# Patient Record
Sex: FEMALE | Race: WHITE | NOT HISPANIC OR LATINO | Employment: OTHER | URBAN - METROPOLITAN AREA
[De-identification: names, ages, dates, MRNs, and addresses within clinical notes are randomized per-mention and may not be internally consistent; named-entity substitution may affect disease eponyms.]

---

## 2023-10-07 ENCOUNTER — APPOINTMENT (OUTPATIENT)
Dept: RADIOLOGY | Facility: HOSPITAL | Age: 83
End: 2023-10-07
Payer: MEDICARE

## 2023-10-07 ENCOUNTER — HOSPITAL ENCOUNTER (OUTPATIENT)
Facility: HOSPITAL | Age: 83
Setting detail: OBSERVATION
Discharge: HOME | End: 2023-10-08
Attending: STUDENT IN AN ORGANIZED HEALTH CARE EDUCATION/TRAINING PROGRAM | Admitting: INTERNAL MEDICINE
Payer: MEDICARE

## 2023-10-07 DIAGNOSIS — R10.84 GENERALIZED ABDOMINAL PAIN: Primary | ICD-10-CM

## 2023-10-07 DIAGNOSIS — R10.10 PAIN OF UPPER ABDOMEN: ICD-10-CM

## 2023-10-07 PROBLEM — R10.9 ABDOMINAL PAIN: Status: ACTIVE | Noted: 2023-10-07

## 2023-10-07 LAB
ALBUMIN SERPL BCP-MCNC: 4.5 G/DL (ref 3.4–5)
ALP SERPL-CCNC: 66 U/L (ref 33–136)
ALT SERPL W P-5'-P-CCNC: 13 U/L (ref 7–45)
ANION GAP SERPL CALC-SCNC: 15 MMOL/L (ref 10–20)
APPEARANCE UR: CLEAR
AST SERPL W P-5'-P-CCNC: 17 U/L (ref 9–39)
BASOPHILS # BLD AUTO: 0.08 X10*3/UL (ref 0–0.1)
BASOPHILS NFR BLD AUTO: 1 %
BILIRUB SERPL-MCNC: 0.6 MG/DL (ref 0–1.2)
BILIRUB UR STRIP.AUTO-MCNC: NEGATIVE MG/DL
BUN SERPL-MCNC: 15 MG/DL (ref 6–23)
CALCIUM SERPL-MCNC: 9.2 MG/DL (ref 8.6–10.3)
CARDIAC TROPONIN I PNL SERPL HS: 6 NG/L (ref 0–13)
CHLORIDE SERPL-SCNC: 102 MMOL/L (ref 98–107)
CO2 SERPL-SCNC: 29 MMOL/L (ref 21–32)
COLOR UR: NORMAL
CREAT SERPL-MCNC: 0.62 MG/DL (ref 0.5–1.05)
EOSINOPHIL # BLD AUTO: 0.2 X10*3/UL (ref 0–0.4)
EOSINOPHIL NFR BLD AUTO: 2.5 %
ERYTHROCYTE [DISTWIDTH] IN BLOOD BY AUTOMATED COUNT: 13.2 % (ref 11.5–14.5)
GFR SERPL CREATININE-BSD FRML MDRD: 88 ML/MIN/1.73M*2
GLUCOSE SERPL-MCNC: 96 MG/DL (ref 74–99)
GLUCOSE UR STRIP.AUTO-MCNC: NEGATIVE MG/DL
HCT VFR BLD AUTO: 44.4 % (ref 36–46)
HGB BLD-MCNC: 14.1 G/DL (ref 12–16)
IMM GRANULOCYTES # BLD AUTO: 0.02 X10*3/UL (ref 0–0.5)
IMM GRANULOCYTES NFR BLD AUTO: 0.3 % (ref 0–0.9)
KETONES UR STRIP.AUTO-MCNC: NEGATIVE MG/DL
LACTATE SERPL-SCNC: 1.1 MMOL/L (ref 0.4–2)
LEUKOCYTE ESTERASE UR QL STRIP.AUTO: NEGATIVE
LIPASE SERPL-CCNC: 12 U/L (ref 9–82)
LYMPHOCYTES # BLD AUTO: 1.85 X10*3/UL (ref 0.8–3)
LYMPHOCYTES NFR BLD AUTO: 23.5 %
MCH RBC QN AUTO: 29.6 PG (ref 26–34)
MCHC RBC AUTO-ENTMCNC: 31.8 G/DL (ref 32–36)
MCV RBC AUTO: 93 FL (ref 80–100)
MONOCYTES # BLD AUTO: 0.78 X10*3/UL (ref 0.05–0.8)
MONOCYTES NFR BLD AUTO: 9.9 %
NEUTROPHILS # BLD AUTO: 4.93 X10*3/UL (ref 1.6–5.5)
NEUTROPHILS NFR BLD AUTO: 62.8 %
NITRITE UR QL STRIP.AUTO: NEGATIVE
NRBC BLD-RTO: 0 /100 WBCS (ref 0–0)
PH UR STRIP.AUTO: 7 [PH]
PLATELET # BLD AUTO: 244 X10*3/UL (ref 150–450)
PMV BLD AUTO: 10 FL (ref 7.5–11.5)
POTASSIUM SERPL-SCNC: 3.5 MMOL/L (ref 3.5–5.3)
PROT SERPL-MCNC: 6.9 G/DL (ref 6.4–8.2)
PROT UR STRIP.AUTO-MCNC: NEGATIVE MG/DL
RBC # BLD AUTO: 4.76 X10*6/UL (ref 4–5.2)
RBC # UR STRIP.AUTO: NEGATIVE /UL
SODIUM SERPL-SCNC: 142 MMOL/L (ref 136–145)
SP GR UR STRIP.AUTO: 1.03
UROBILINOGEN UR STRIP.AUTO-MCNC: <2 MG/DL
WBC # BLD AUTO: 7.9 X10*3/UL (ref 4.4–11.3)

## 2023-10-07 PROCEDURE — 84484 ASSAY OF TROPONIN QUANT: CPT | Performed by: PHYSICIAN ASSISTANT

## 2023-10-07 PROCEDURE — 74177 CT ABD & PELVIS W/CONTRAST: CPT | Performed by: RADIOLOGY

## 2023-10-07 PROCEDURE — 74177 CT ABD & PELVIS W/CONTRAST: CPT | Mod: MG

## 2023-10-07 PROCEDURE — 36415 COLL VENOUS BLD VENIPUNCTURE: CPT | Performed by: PHYSICIAN ASSISTANT

## 2023-10-07 PROCEDURE — 83690 ASSAY OF LIPASE: CPT | Performed by: PHYSICIAN ASSISTANT

## 2023-10-07 PROCEDURE — 85025 COMPLETE CBC W/AUTO DIFF WBC: CPT | Performed by: PHYSICIAN ASSISTANT

## 2023-10-07 PROCEDURE — 81003 URINALYSIS AUTO W/O SCOPE: CPT | Performed by: PHYSICIAN ASSISTANT

## 2023-10-07 PROCEDURE — 83605 ASSAY OF LACTIC ACID: CPT | Performed by: PHYSICIAN ASSISTANT

## 2023-10-07 PROCEDURE — 80053 COMPREHEN METABOLIC PANEL: CPT | Performed by: PHYSICIAN ASSISTANT

## 2023-10-07 PROCEDURE — 99223 1ST HOSP IP/OBS HIGH 75: CPT | Performed by: INTERNAL MEDICINE

## 2023-10-07 PROCEDURE — 99285 EMERGENCY DEPT VISIT HI MDM: CPT | Performed by: STUDENT IN AN ORGANIZED HEALTH CARE EDUCATION/TRAINING PROGRAM

## 2023-10-07 PROCEDURE — 2550000001 HC RX 255 CONTRASTS: Performed by: PHYSICIAN ASSISTANT

## 2023-10-07 RX ADMIN — IOHEXOL 75 ML: 350 INJECTION, SOLUTION INTRAVENOUS at 19:08

## 2023-10-07 ASSESSMENT — LIFESTYLE VARIABLES
EVER FELT BAD OR GUILTY ABOUT YOUR DRINKING: NO
HAVE YOU EVER FELT YOU SHOULD CUT DOWN ON YOUR DRINKING: NO
EVER HAD A DRINK FIRST THING IN THE MORNING TO STEADY YOUR NERVES TO GET RID OF A HANGOVER: NO
HAVE PEOPLE ANNOYED YOU BY CRITICIZING YOUR DRINKING: NO

## 2023-10-07 ASSESSMENT — PAIN - FUNCTIONAL ASSESSMENT: PAIN_FUNCTIONAL_ASSESSMENT: 0-10

## 2023-10-07 ASSESSMENT — PAIN DESCRIPTION - DESCRIPTORS
DESCRIPTORS: ACHING
DESCRIPTORS: ACHING

## 2023-10-07 ASSESSMENT — PAIN DESCRIPTION - PAIN TYPE: TYPE: ACUTE PAIN

## 2023-10-07 ASSESSMENT — COLUMBIA-SUICIDE SEVERITY RATING SCALE - C-SSRS
1. IN THE PAST MONTH, HAVE YOU WISHED YOU WERE DEAD OR WISHED YOU COULD GO TO SLEEP AND NOT WAKE UP?: NO
2. HAVE YOU ACTUALLY HAD ANY THOUGHTS OF KILLING YOURSELF?: NO
6. HAVE YOU EVER DONE ANYTHING, STARTED TO DO ANYTHING, OR PREPARED TO DO ANYTHING TO END YOUR LIFE?: NO

## 2023-10-07 ASSESSMENT — PAIN DESCRIPTION - ORIENTATION: ORIENTATION: LOWER

## 2023-10-07 ASSESSMENT — PAIN DESCRIPTION - LOCATION: LOCATION: ABDOMEN

## 2023-10-07 ASSESSMENT — PAIN SCALES - GENERAL: PAINLEVEL_OUTOF10: 4

## 2023-10-07 NOTE — ED PROVIDER NOTES
HPI   Chief Complaint   Patient presents with   • Abdominal Pain       History of present illness:  83-year-old female presents to the emergency room for complaints of abdominal pain.  The patient states that she has had multiple hernia surgeries including a hiatal hernia repair as well as ventral hernia repairs and inguinal hernia repairs in the past.  She states that she suffered a fistula in the distant past and in 1980 she had to have a colostomy done after one of her hernia surgeries unfortunately developed into a fistula.  She states that she takes medications for hypertension but does not take any other daily medications.  She states that she has no other previous medical history.  She states that beginning 6 days ago she began having pain in her upper abdomen and intense vomiting.  She states that this lasted for about 8 or 9 hours before resolving its own and she states that she was able to eat afterwards.  She states that she has had 3 more episodes of this since then and states most recent 1 began about 9:00 this morning and has not resolved yet.  She states most the pain is in her mid and upper abdomen and she has been vomiting profusely.  She states that she is not having any pain at this time and declines any pain medications.    Social history: Negative for alcohol and drug use.    Review of systems:   Gen.: No weight loss, fatigue, anorexia, insomnia, fever.   Eyes: No vision loss, double vision, drainage, eye pain.   ENT: No pharyngitis, neck pain  Cardiac: No chest pain, palpitations, syncope, near syncope.   Pulmonary: No shortness of breath, cough, hemoptysis.   Heme/lymph: No swollen glands, fever, bleeding.   GI: No change in bowel habits, melena, hematemesis, hematochezia, diarrhea.   : No discharge, dysuria, frequency, urgency, hematuria.   Musculoskeletal: No limb pain, joint pain, joint swelling.   Skin: No rashes.   Review of systems is otherwise negative unless stated above or in  history of present illness.      Physical exam:  General: Vitals noted, no distress. Afebrile.   EENT: No lymphadenopathy   Cardiac: Regular, rate, rhythm, no murmur.   Pulmonary: Lungs clear bilaterally with good aeration. No adventitious breath sounds.   Abdomen: Soft, nonsurgical. Nontender. No peritoneal signs. Normoactive bowel sounds.   Extremities: No peripheral edema.   Skin: No rash.   Neuro: No focal neurologic deficits        Medical decision making:   Testing: CBC, CMP, lipase, lactate, urinalysis, CT scan abdomen pelvis with contrast: CT scan does not show any acute findings and the hernias to appear to contain some bowel but it does not appear to be inflamed at this time as interpreted by radiology laboratory testing otherwise unremarkable for any acute findings  Treatment: IV fluids given  Reevaluation:   Plan: 83-year-old female presents to the emergency room for complaints of abdominal pain.  The patient states that she has had multiple hernia surgeries including a hiatal hernia repair as well as ventral hernia repairs and inguinal hernia repairs in the past.  She states that she suffered a fistula in the distant past and in 1980 she had to have a colostomy done after one of her hernia surgeries unfortunately developed into a fistula.  She states that she takes medications for hypertension but does not take any other daily medications.  She states that she has no other previous medical history.  She states that beginning 6 days ago she began having pain in her upper abdomen and intense vomiting.  She states that this lasted for about 8 or 9 hours before resolving its own and she states that she was able to eat afterwards.  She states that she has had 3 more episodes of this since then and states most recent 1 began about 9:00 this morning and has not resolved yet.  She states most the pain is in her mid and upper abdomen and she has been vomiting profusely.  She states that she is not having any  pain at this time and declines any pain medications.  On physical exam the patient has a ventral hernia that is easily repaired reducible and there are no other acute findings she has normal active bowel sounds throughout.  She does not appear to be any acute distress at this time.  Laboratory testing is unremarkable and CT scan shows hernias that are present.  I spoke with Dr. Guzman of general surgery who reviewed the case remotely and explained to me that she felt that the patient should undergo bowel rest and admission and IV fluids.  The patient was agreeable to this plan and was admitted under the care of the hospitalist at this time for the further management and care.  Impression:   1.  Abdominal pain            History provided by:  Patient   used: No                        No data recorded                Patient History   Past Medical History:   Diagnosis Date   • Personal history of other diseases of the circulatory system 07/18/2014    History of hypertension     Past Surgical History:   Procedure Laterality Date   • BREAST LUMPECTOMY  02/24/2014    Breast Surgery Lumpectomy   • HEMORRHOID SURGERY  02/24/2014    Hemorrhoidectomy   • HYSTERECTOMY  10/27/2015    Hysterectomy   • OTHER SURGICAL HISTORY  12/18/2015    Excision Of Rectal Procidentia Perineal Approach   • VARICOSE VEIN SURGERY  02/24/2014    Varicose Vein Ligation     No family history on file.  Social History     Tobacco Use   • Smoking status: Never   • Smokeless tobacco: Never   Substance Use Topics   • Alcohol use: Never   • Drug use: Never       Physical Exam   ED Triage Vitals [10/07/23 1714]   Temp Heart Rate Resp BP   36.2 °C (97.2 °F) 73 18 (!) 214/91      SpO2 Temp src Heart Rate Source Patient Position   96 % -- -- --      BP Location FiO2 (%)     -- --       Physical Exam    ED Course & [facility]   ED Course as of 11/06/23 1437   Wed Nov 01, 2023   1852 EKG taken on October 7, 2023 at 1845 shows sinus bradycardia  first-degree AV block with a rate of 55, no ST elevation depression present no T wave inversion [JF]      ED Course User Index  [JF] Poncho Gruber PA-C         Diagnoses as of 11/06/23 1437   Generalized abdominal pain       Medical Decision Making  This patient was seen by the advanced practice provider or resident above.  I have seen and examined the patient, agree with the workup, evaluation, management and diagnosis. The care plan has beendiscussed.     My assessment is a 83-year-old female presents emergency department with abdominal pain.  Does have a history of moderate multiple intra-abdominal surgeries.  Patient has a ventral hernia.  Ventral hernia is reducible at bedside.  Spoke to Dr. Guzman who recommended bowel rest admission.  Patient amendable.  Patient admitted to medicine with surgery on consult.    Jo-Ann Day DO  Emergency Medicine    I have personally performed and/or participated in all of the above services and procedures. I have reviewed all the nurses' notes and have confirmed their findings, and have incorporated those findings into this medical record.     I have reviewed the resident or advanced practice provider's history and physician finding; as well as the treatment. On my own examination, I agree and incorporated in this document my own history, examination findings and clinical decision making.    All notation in this Addendum supersedes information presented by the resident or KATHERYN as listed above.          Procedure  Procedures     Jo-Ann Day DO  10/10/23 0440       Poncho Gruber PA-C  11/06/23 1437

## 2023-10-08 VITALS
BODY MASS INDEX: 32.42 KG/M2 | HEART RATE: 58 BPM | HEIGHT: 63 IN | RESPIRATION RATE: 17 BRPM | SYSTOLIC BLOOD PRESSURE: 156 MMHG | OXYGEN SATURATION: 95 % | DIASTOLIC BLOOD PRESSURE: 71 MMHG | WEIGHT: 182.98 LBS | TEMPERATURE: 97.3 F

## 2023-10-08 LAB
ANION GAP SERPL CALC-SCNC: 14 MMOL/L (ref 10–20)
BUN SERPL-MCNC: 13 MG/DL (ref 6–23)
CALCIUM SERPL-MCNC: 8.7 MG/DL (ref 8.6–10.3)
CHLORIDE SERPL-SCNC: 105 MMOL/L (ref 98–107)
CO2 SERPL-SCNC: 26 MMOL/L (ref 21–32)
CREAT SERPL-MCNC: 0.61 MG/DL (ref 0.5–1.05)
ERYTHROCYTE [DISTWIDTH] IN BLOOD BY AUTOMATED COUNT: 13.1 % (ref 11.5–14.5)
GFR SERPL CREATININE-BSD FRML MDRD: 89 ML/MIN/1.73M*2
GLUCOSE SERPL-MCNC: 97 MG/DL (ref 74–99)
HCT VFR BLD AUTO: 41.3 % (ref 36–46)
HGB BLD-MCNC: 13.3 G/DL (ref 12–16)
MCH RBC QN AUTO: 30 PG (ref 26–34)
MCHC RBC AUTO-ENTMCNC: 32.2 G/DL (ref 32–36)
MCV RBC AUTO: 93 FL (ref 80–100)
NRBC BLD-RTO: 0 /100 WBCS (ref 0–0)
PLATELET # BLD AUTO: 208 X10*3/UL (ref 150–450)
PMV BLD AUTO: 10.3 FL (ref 7.5–11.5)
POTASSIUM SERPL-SCNC: 3.8 MMOL/L (ref 3.5–5.3)
RBC # BLD AUTO: 4.44 X10*6/UL (ref 4–5.2)
SODIUM SERPL-SCNC: 141 MMOL/L (ref 136–145)
WBC # BLD AUTO: 6.5 X10*3/UL (ref 4.4–11.3)

## 2023-10-08 PROCEDURE — 96372 THER/PROPH/DIAG INJ SC/IM: CPT | Performed by: INTERNAL MEDICINE

## 2023-10-08 PROCEDURE — 85027 COMPLETE CBC AUTOMATED: CPT | Performed by: INTERNAL MEDICINE

## 2023-10-08 PROCEDURE — 36415 COLL VENOUS BLD VENIPUNCTURE: CPT | Performed by: INTERNAL MEDICINE

## 2023-10-08 PROCEDURE — 2500000001 HC RX 250 WO HCPCS SELF ADMINISTERED DRUGS (ALT 637 FOR MEDICARE OP): Performed by: INTERNAL MEDICINE

## 2023-10-08 PROCEDURE — 99221 1ST HOSP IP/OBS SF/LOW 40: CPT | Performed by: SURGERY

## 2023-10-08 PROCEDURE — 99239 HOSP IP/OBS DSCHRG MGMT >30: CPT | Performed by: FAMILY MEDICINE

## 2023-10-08 PROCEDURE — 2500000001 HC RX 250 WO HCPCS SELF ADMINISTERED DRUGS (ALT 637 FOR MEDICARE OP)

## 2023-10-08 PROCEDURE — G0378 HOSPITAL OBSERVATION PER HR: HCPCS

## 2023-10-08 PROCEDURE — 80048 BASIC METABOLIC PNL TOTAL CA: CPT | Performed by: INTERNAL MEDICINE

## 2023-10-08 PROCEDURE — 2500000004 HC RX 250 GENERAL PHARMACY W/ HCPCS (ALT 636 FOR OP/ED): Performed by: INTERNAL MEDICINE

## 2023-10-08 RX ORDER — ACETAMINOPHEN 650 MG/1
650 SUPPOSITORY RECTAL EVERY 4 HOURS PRN
Status: DISCONTINUED | OUTPATIENT
Start: 2023-10-08 | End: 2023-10-08 | Stop reason: HOSPADM

## 2023-10-08 RX ORDER — FUROSEMIDE 20 MG/1
20 TABLET ORAL DAILY
COMMUNITY
End: 2024-04-29 | Stop reason: ALTCHOICE

## 2023-10-08 RX ORDER — ONDANSETRON HYDROCHLORIDE 2 MG/ML
4 INJECTION, SOLUTION INTRAVENOUS EVERY 8 HOURS PRN
Status: DISCONTINUED | OUTPATIENT
Start: 2023-10-08 | End: 2023-10-08 | Stop reason: HOSPADM

## 2023-10-08 RX ORDER — LOSARTAN POTASSIUM 50 MG/1
TABLET ORAL
Status: COMPLETED
Start: 2023-10-08 | End: 2023-10-08

## 2023-10-08 RX ORDER — ALUMINUM HYDROXIDE, MAGNESIUM HYDROXIDE, AND SIMETHICONE 1200; 120; 1200 MG/30ML; MG/30ML; MG/30ML
30 SUSPENSION ORAL EVERY 6 HOURS PRN
Status: DISCONTINUED | OUTPATIENT
Start: 2023-10-08 | End: 2023-10-08 | Stop reason: HOSPADM

## 2023-10-08 RX ORDER — LOSARTAN POTASSIUM 50 MG/1
50 TABLET ORAL 2 TIMES DAILY
COMMUNITY
End: 2023-11-14 | Stop reason: HOSPADM

## 2023-10-08 RX ORDER — ATORVASTATIN CALCIUM 10 MG/1
10 TABLET, FILM COATED ORAL DAILY
COMMUNITY

## 2023-10-08 RX ORDER — CYANOCOBALAMIN (VITAMIN B-12) 1000MCG/ML
1 DROPS ORAL DAILY
COMMUNITY

## 2023-10-08 RX ORDER — ACETAMINOPHEN 325 MG/1
650 TABLET ORAL EVERY 4 HOURS PRN
Status: DISCONTINUED | OUTPATIENT
Start: 2023-10-08 | End: 2023-10-08 | Stop reason: HOSPADM

## 2023-10-08 RX ORDER — ACETAMINOPHEN 160 MG/5ML
650 SOLUTION ORAL EVERY 4 HOURS PRN
Status: DISCONTINUED | OUTPATIENT
Start: 2023-10-08 | End: 2023-10-08 | Stop reason: HOSPADM

## 2023-10-08 RX ORDER — LOSARTAN POTASSIUM 50 MG/1
50 TABLET ORAL 2 TIMES DAILY
Status: DISCONTINUED | OUTPATIENT
Start: 2023-10-08 | End: 2023-10-08 | Stop reason: HOSPADM

## 2023-10-08 RX ORDER — DEXTROSE MONOHYDRATE AND SODIUM CHLORIDE 5; .45 G/100ML; G/100ML
85 INJECTION, SOLUTION INTRAVENOUS CONTINUOUS
Status: DISCONTINUED | OUTPATIENT
Start: 2023-10-08 | End: 2023-10-08 | Stop reason: HOSPADM

## 2023-10-08 RX ORDER — GUAIFENESIN/DEXTROMETHORPHAN 100-10MG/5
5 SYRUP ORAL EVERY 4 HOURS PRN
Status: DISCONTINUED | OUTPATIENT
Start: 2023-10-08 | End: 2023-10-08 | Stop reason: HOSPADM

## 2023-10-08 RX ORDER — GUAIFENESIN 600 MG/1
600 TABLET, EXTENDED RELEASE ORAL EVERY 12 HOURS PRN
Status: DISCONTINUED | OUTPATIENT
Start: 2023-10-08 | End: 2023-10-08 | Stop reason: HOSPADM

## 2023-10-08 RX ORDER — ONDANSETRON 4 MG/1
4 TABLET, ORALLY DISINTEGRATING ORAL EVERY 8 HOURS PRN
Status: DISCONTINUED | OUTPATIENT
Start: 2023-10-08 | End: 2023-10-08 | Stop reason: HOSPADM

## 2023-10-08 RX ORDER — ENOXAPARIN SODIUM 100 MG/ML
40 INJECTION SUBCUTANEOUS EVERY 24 HOURS
Status: DISCONTINUED | OUTPATIENT
Start: 2023-10-08 | End: 2023-10-08 | Stop reason: HOSPADM

## 2023-10-08 RX ADMIN — LOSARTAN POTASSIUM 50 MG: 50 TABLET, FILM COATED ORAL at 01:36

## 2023-10-08 RX ADMIN — DEXTROSE AND SODIUM CHLORIDE 85 ML/HR: 5; 450 INJECTION, SOLUTION INTRAVENOUS at 01:37

## 2023-10-08 RX ADMIN — ENOXAPARIN SODIUM 40 MG: 40 INJECTION SUBCUTANEOUS at 01:37

## 2023-10-08 RX ADMIN — LOSARTAN POTASSIUM 50 MG: 50 TABLET, FILM COATED ORAL at 08:25

## 2023-10-08 SDOH — ECONOMIC STABILITY: FOOD INSECURITY: WITHIN THE PAST 12 MONTHS, THE FOOD YOU BOUGHT JUST DIDN'T LAST AND YOU DIDN'T HAVE MONEY TO GET MORE.: NEVER TRUE

## 2023-10-08 SDOH — ECONOMIC STABILITY: INCOME INSECURITY: HOW HARD IS IT FOR YOU TO PAY FOR THE VERY BASICS LIKE FOOD, HOUSING, MEDICAL CARE, AND HEATING?: NOT HARD AT ALL

## 2023-10-08 SDOH — ECONOMIC STABILITY: INCOME INSECURITY: IN THE PAST 12 MONTHS, HAS THE ELECTRIC, GAS, OIL, OR WATER COMPANY THREATENED TO SHUT OFF SERVICE IN YOUR HOME?: NO

## 2023-10-08 SDOH — ECONOMIC STABILITY: HOUSING INSECURITY
IN THE LAST 12 MONTHS, WAS THERE A TIME WHEN YOU DID NOT HAVE A STEADY PLACE TO SLEEP OR SLEPT IN A SHELTER (INCLUDING NOW)?: NO

## 2023-10-08 SDOH — HEALTH STABILITY: PHYSICAL HEALTH: ON AVERAGE, HOW MANY DAYS PER WEEK DO YOU ENGAGE IN MODERATE TO STRENUOUS EXERCISE (LIKE A BRISK WALK)?: 0 DAYS

## 2023-10-08 SDOH — HEALTH STABILITY: MENTAL HEALTH: HOW MANY STANDARD DRINKS CONTAINING ALCOHOL DO YOU HAVE ON A TYPICAL DAY?: PATIENT DOES NOT DRINK

## 2023-10-08 SDOH — ECONOMIC STABILITY: FOOD INSECURITY: WITHIN THE PAST 12 MONTHS, YOU WORRIED THAT YOUR FOOD WOULD RUN OUT BEFORE YOU GOT MONEY TO BUY MORE.: NEVER TRUE

## 2023-10-08 SDOH — ECONOMIC STABILITY: INCOME INSECURITY: IN THE LAST 12 MONTHS, WAS THERE A TIME WHEN YOU WERE NOT ABLE TO PAY THE MORTGAGE OR RENT ON TIME?: NO

## 2023-10-08 SDOH — HEALTH STABILITY: MENTAL HEALTH: HOW OFTEN DO YOU HAVE A DRINK CONTAINING ALCOHOL?: NEVER

## 2023-10-08 SDOH — ECONOMIC STABILITY: HOUSING INSECURITY: IN THE LAST 12 MONTHS, HOW MANY PLACES HAVE YOU LIVED?: 1

## 2023-10-08 SDOH — HEALTH STABILITY: MENTAL HEALTH: HOW OFTEN DO YOU HAVE 6 OR MORE DRINKS ON ONE OCCASION?: NEVER

## 2023-10-08 SDOH — ECONOMIC STABILITY: TRANSPORTATION INSECURITY
IN THE PAST 12 MONTHS, HAS LACK OF TRANSPORTATION KEPT YOU FROM MEETINGS, WORK, OR FROM GETTING THINGS NEEDED FOR DAILY LIVING?: NO

## 2023-10-08 SDOH — ECONOMIC STABILITY: TRANSPORTATION INSECURITY
IN THE PAST 12 MONTHS, HAS THE LACK OF TRANSPORTATION KEPT YOU FROM MEDICAL APPOINTMENTS OR FROM GETTING MEDICATIONS?: NO

## 2023-10-08 SDOH — HEALTH STABILITY: PHYSICAL HEALTH: ON AVERAGE, HOW MANY MINUTES DO YOU ENGAGE IN EXERCISE AT THIS LEVEL?: 0 MIN

## 2023-10-08 ASSESSMENT — COGNITIVE AND FUNCTIONAL STATUS - GENERAL
MOBILITY SCORE: 22
PATIENT BASELINE BEDBOUND: NO
MOBILITY SCORE: 22
CLIMB 3 TO 5 STEPS WITH RAILING: A LOT
DAILY ACTIVITIY SCORE: 24
DAILY ACTIVITIY SCORE: 24
CLIMB 3 TO 5 STEPS WITH RAILING: A LOT

## 2023-10-08 ASSESSMENT — ENCOUNTER SYMPTOMS
PSYCHIATRIC NEGATIVE: 1
ALLERGIC/IMMUNOLOGIC NEGATIVE: 1
CHILLS: 0
ROS GI COMMENTS: SEE HPI
RESPIRATORY NEGATIVE: 1
FATIGUE: 0
CARDIOVASCULAR NEGATIVE: 1
NEUROLOGICAL NEGATIVE: 1
DIAPHORESIS: 0
MUSCULOSKELETAL NEGATIVE: 1
ACTIVITY CHANGE: 0
FEVER: 0
EYES NEGATIVE: 1
ENDOCRINE NEGATIVE: 1
HEMATOLOGIC/LYMPHATIC NEGATIVE: 1

## 2023-10-08 ASSESSMENT — ACTIVITIES OF DAILY LIVING (ADL)
ADEQUATE_TO_COMPLETE_ADL: YES
ASSISTIVE_DEVICE: DENTURES UPPER;EYEGLASSES;WALKER
HEARING - RIGHT EAR: HEARING AID
FEEDING YOURSELF: INDEPENDENT
GROOMING: INDEPENDENT
WALKS IN HOME: INDEPENDENT
TOILETING: INDEPENDENT
PATIENT'S MEMORY ADEQUATE TO SAFELY COMPLETE DAILY ACTIVITIES?: YES
JUDGMENT_ADEQUATE_SAFELY_COMPLETE_DAILY_ACTIVITIES: YES
DRESSING YOURSELF: INDEPENDENT
BATHING: INDEPENDENT
HEARING - LEFT EAR: HEARING AID

## 2023-10-08 ASSESSMENT — PAIN - FUNCTIONAL ASSESSMENT
PAIN_FUNCTIONAL_ASSESSMENT: 0-10
PAIN_FUNCTIONAL_ASSESSMENT: 0-10

## 2023-10-08 ASSESSMENT — PAIN SCALES - GENERAL
PAINLEVEL_OUTOF10: 4
PAINLEVEL_OUTOF10: 0 - NO PAIN

## 2023-10-08 ASSESSMENT — LIFESTYLE VARIABLES
AUDIT-C TOTAL SCORE: 0
SKIP TO QUESTIONS 9-10: 1

## 2023-10-08 NOTE — CONSULTS
"Reason For Consult  Abdominal pain    History Of Present Illness  Sunita Luther is a 83 y.o. female presenting with an episode of abdominal pain and vomiting that occurred 5 days ago and then again yesterday. Yesterday's episode not quite as severe but was advised to come to the ER. She has continued to have colostomy output the entire time     Past Medical History  She has a past medical history of Cardiomyopathy (CMS/HCC), Colovaginal fistula, Diverticulosis, Lumbar disc displacement without myelopathy, Lumbar radiculitis, Lumbar spondylosis, Osteoarthritis of right hip, Osteoporosis, Parastomal hernia without obstruction or gangrene, Personal history of other diseases of the circulatory system (07/18/2014), and Postmenopausal bleeding. Also hx of hyperlipidema    Surgical History  She has a past surgical history that includes Hemorrhoid surgery (02/24/2014); Varicose vein surgery (02/24/2014); Breast lumpectomy (02/24/2014); Hysterectomy (10/27/2015); and Other surgical history (12/18/2015).     Social History  She reports that she has never smoked. She has never been exposed to tobacco smoke. She has never used smokeless tobacco. She reports that she does not drink alcohol and does not use drugs.    Family History  Family History   Problem Relation Name Age of Onset    Hypertension Mother      Heart attack Mother      Stroke Father      Lung cancer Father          Allergies  Bactrim [sulfamethoxazole-trimethoprim]    Review of Systems  Currently feels well. Has had this in the past  No other complaints     Physical Exam  HEENT NR  Lungs clear  Heart RRR  Abd soft , NT, large bulging inferior to parastomal hernia     Last Recorded Vitals  Blood pressure 162/71, pulse 60, temperature 36.2 °C (97.2 °F), resp. rate 18, height 1.6 m (5' 2.99\"), weight 83 kg (182 lb 15.7 oz), SpO2 96 %.    Relevant Results  Ct no obstruction     Assessment/Plan     Abd pain now resolved  Rec start clears and adv slowly  Can follow up in " my office    I spent 20 minutes in the professional and overall care of this patient.      Ambar Guzman MD

## 2023-10-08 NOTE — NURSING NOTE
Discharge instructions reviewed with patient. No changes to medications. Follow ups reviewed. No additional questions. Awaiting transport home.

## 2023-10-08 NOTE — PROGRESS NOTES
10/08/23 3750   Discharge Planning   Living Arrangements Other (Comment)  ([facility])   Support Systems Friends/neighbors   Assistance Needed Patient from [facility], x3, ambulates with walker at night, doens't drive( other [facility] at [place] provide transport), room air   Type of Residence Assisted living   [facility]   Home or Post Acute Services None   Patient expects to be discharged to: [facility], denies [facility] needs, ( [facility] to contact [facility] upon d/c and they will arrange transport)   Does the patient need discharge transport arranged? Yes   RoundTrip coordination needed? No   Has discharge transport been arranged? No        Noted, and thank you for these updates.    Please inform patient that she likely did not have something stuck at the bottom of her throat because she tolerated both solid and liquid trials in the emergency room.    Also, she can report back to our pharmacy to switch out storage containers if she wishes that may be easier for her to open.    No additional changes to our current management plan are indicated, and the patient is to continue to follow-up as previously instructed.  Thank you.

## 2023-10-08 NOTE — H&P
History Of Present Illness  Sunita Luther is an 83 y.o. female from [facility] with history of diverticulosis, colovagjnal fistula - s/p colostomy and multiple abdominal surgeries presenting with abdominal pain. She reports being in her USOH until 2 weeks ago when she began having intermittent upper abdominal pain associated with nausea and vomiting. She denied diarrhea, hematemesis, hematochezia, melena, fever or chills. She presented to the ED tonight and CT of the abdomen and pelvis showed presence of preexisting parastomal hernia without evidence of bowel obstruction.     Past Medical History  Past Medical History:   Diagnosis Date    Cardiomyopathy (CMS/HCC)     Colovaginal fistula     Diverticulosis     Lumbar disc displacement without myelopathy     Lumbar radiculitis     Lumbar spondylosis     Osteoarthritis of right hip     Osteoporosis     Parastomal hernia without obstruction or gangrene     Personal history of other diseases of the circulatory system 07/18/2014    History of hypertension    Postmenopausal bleeding        Surgical History  Past Surgical History:   Procedure Laterality Date    BREAST LUMPECTOMY  02/24/2014    Breast Surgery Lumpectomy    HEMORRHOID SURGERY  02/24/2014    Hemorrhoidectomy    HYSTERECTOMY  10/27/2015    Hysterectomy    OTHER SURGICAL HISTORY  12/18/2015    Excision Of Rectal Procidentia Perineal Approach    VARICOSE VEIN SURGERY  02/24/2014    Varicose Vein Ligation        Social History  She reports that she has never smoked. She has never been exposed to tobacco smoke. She has never used smokeless tobacco. She reports that she does not drink alcohol and does not use drugs.    Family History  Family History   Problem Relation Name Age of Onset    Hypertension Mother      Heart attack Mother      Stroke Father      Lung cancer Father          Allergies  Patient has no known allergies.    Review of Systems   Constitutional:  Negative for activity change, chills,  diaphoresis, fatigue and fever.   HENT: Negative.     Eyes: Negative.    Respiratory: Negative.     Cardiovascular: Negative.    Gastrointestinal:         See HPI   Endocrine: Negative.    Genitourinary: Negative.    Musculoskeletal: Negative.    Skin: Negative.    Allergic/Immunologic: Negative.    Neurological: Negative.    Hematological: Negative.    Psychiatric/Behavioral: Negative.          Physical Exam  Constitutional:       General: She is not in acute distress.     Appearance: She is obese. She is not ill-appearing, toxic-appearing or diaphoretic.   HENT:      Head: Normocephalic and atraumatic.      Nose: Nose normal.      Mouth/Throat:      Mouth: Mucous membranes are dry.      Pharynx: No oropharyngeal exudate or posterior oropharyngeal erythema.   Eyes:      General: No scleral icterus.        Right eye: No discharge.         Left eye: No discharge.      Conjunctiva/sclera: Conjunctivae normal.   Neck:      Vascular: No carotid bruit.   Cardiovascular:      Rate and Rhythm: Normal rate and regular rhythm.      Heart sounds: Normal heart sounds. No murmur heard.     No gallop.   Pulmonary:      Breath sounds: Normal breath sounds. No wheezing, rhonchi or rales.   Abdominal:      Palpations: Abdomen is soft. There is mass.      Tenderness: There is abdominal tenderness. There is no right CVA tenderness, left CVA tenderness, guarding or rebound.      Hernia: A hernia is present.      Comments: Colostomy present with large parastomal hernia visible. Mild tenderness in periumbilical area.   Musculoskeletal:      Cervical back: Neck supple.      Right lower leg: No edema.      Left lower leg: No edema.   Lymphadenopathy:      Cervical: No cervical adenopathy.   Skin:     General: Skin is warm and dry.      Findings: No lesion or rash.   Neurological:      General: No focal deficit present.      Mental Status: She is alert and oriented to person, place, and time.      Sensory: No sensory deficit.      Motor:  "No weakness.   Psychiatric:         Mood and Affect: Mood normal.         Behavior: Behavior normal.          Last Recorded Vitals  Blood pressure 166/86, pulse 73, temperature 36.2 °C (97.2 °F), resp. rate 18, height 1.6 m (5' 3\"), weight 83 kg (183 lb), SpO2 97 %.    Relevant Results     Latest Reference Range & Units 10/07/23 18:10 10/07/23 20:02   GLUCOSE 74 - 99 mg/dL 96    SODIUM 136 - 145 mmol/L 142    POTASSIUM 3.5 - 5.3 mmol/L 3.5    CHLORIDE 98 - 107 mmol/L 102    Bicarbonate 21 - 32 mmol/L 29    Anion Gap 10 - 20 mmol/L 15    Blood Urea Nitrogen 6 - 23 mg/dL 15    Creatinine 0.50 - 1.05 mg/dL 0.62    EGFR >60 mL/min/1.73m*2 88    Calcium 8.6 - 10.3 mg/dL 9.2    Albumin 3.4 - 5.0 g/dL 4.5    Alkaline Phosphatase 33 - 136 U/L 66    ALT 7 - 45 U/L 13    AST 9 - 39 U/L 17    Bilirubin Total 0.0 - 1.2 mg/dL 0.6    Total Protein 6.4 - 8.2 g/dL 6.9    Lactate 0.4 - 2.0 mmol/L 1.1    LIPASE 9 - 82 U/L 12    Troponin I, High Sensitivity 0 - 13 ng/L 6    WBC 4.4 - 11.3 x10*3/uL 7.9    nRBC 0.0 - 0.0 /100 WBCs 0.0    RBC 4.00 - 5.20 x10*6/uL 4.76    HEMOGLOBIN 12.0 - 16.0 g/dL 14.1    HEMATOCRIT 36.0 - 46.0 % 44.4    MCV 80 - 100 fL 93    MCH 26.0 - 34.0 pg 29.6    MCHC 32.0 - 36.0 g/dL 31.8 (L)    RED CELL DISTRIBUTION WIDTH 11.5 - 14.5 % 13.2    Platelets 150 - 450 x10*3/uL 244    MEAN PLATELET VOLUME 7.5 - 11.5 fL 10.0    Neutrophils % 40.0 - 80.0 % 62.8    Immature Granulocytes %, Automated 0.0 - 0.9 % 0.3    Lymphocytes % 13.0 - 44.0 % 23.5    Monocytes % 2.0 - 10.0 % 9.9    Eosinophils % 0.0 - 6.0 % 2.5    Basophils % 0.0 - 2.0 % 1.0    Neutrophils Absolute 1.60 - 5.50 x10*3/uL 4.93    Immature Granulocytes Absolute, Automated 0.00 - 0.50 x10*3/uL 0.02    Lymphocytes Absolute 0.80 - 3.00 x10*3/uL 1.85    Monocytes Absolute 0.05 - 0.80 x10*3/uL 0.78    Eosinophils Absolute 0.00 - 0.40 x10*3/uL 0.20    Basophils Absolute 0.00 - 0.10 x10*3/uL 0.08    Color, Urine Straw, Yellow   Straw   Appearance, Urine Clear   " Clear   Specific Gravity, Urine 1.005 - 1.035   1.028   pH, Urine 5.0, 5.5, 6.0, 6.5, 7.0, 7.5, 8.0   7.0   Protein, Urine NEGATIVE mg/dL  NEGATIVE   Glucose, Urine NEGATIVE mg/dL  NEGATIVE   Blood, Urine NEGATIVE   NEGATIVE   Ketones, Urine NEGATIVE mg/dL  NEGATIVE   Bilirubin, Urine NEGATIVE   NEGATIVE   Urobilinogen, Urine <2.0 mg/dL  <2.0   Nitrite, Urine NEGATIVE   NEGATIVE   Leukocyte Esterase, Urine NEGATIVE   NEGATIVE   (L): Data is abnormally low    CT ABD/PELVIS:  IMPRESSION:  1. No acute abnormality within the abdomen or pelvis.  2. No evidence of bowel obstruction.  3. Left lower quadrant colostomy with prominent peristomal hernia  containing mesenteric fat and small bowel loops. No definite CT  evidence of bowel wall thickening or inflammatory change within the  hernia sac.  4. Ventral midline hernia which contains mesenteric fat and a loop of  transverse colon.  5. Enhancing left adrenal nodule which measures a proximally 1.9 cm.  This does not appear significantly changed from prior exam. Consider  dedicated adrenal mass imaging if not already performed.     Assessment/Plan   Principal Problem:    Abdominal pain  Active Problems:    Pain of upper abdomen  Large parastomal hernia without evidence of obstruction    PLAN:  Observation, NPO, IVF, general surgery to see re: large parastomal hernia, pain control as needed.       I spent 60 minutes in the professional and overall care of this patient.      Dayana Talley MD

## 2023-10-08 NOTE — DISCHARGE SUMMARY
Discharge Diagnosis  Abdominal pain    Issues Requiring Follow-Up  Patient will need outpatient followup with surgery clinic for abdominal pain and ostomy management    Discharge Meds     Your medication list        CONTINUE taking these medications        Instructions Last Dose Given Next Dose Due   atorvastatin 10 mg tablet  Commonly known as: Lipitor           furosemide 20 mg tablet  Commonly known as: Lasix           losartan 50 mg tablet  Commonly known as: Cozaar           melatonin 1 mg tablet, sublingual                    Test Results Pending At Discharge  Pending Labs       Order Current Status    Extra Urine Gray Tube In process    Urinalysis with Reflex Microscopic and Culture In process            Hospital Course   Sunita Luther is an 83 y.o. female from [facility] with history of diverticulosis, colovagjnal fistula - s/p colostomy and multiple abdominal surgeries presenting with abdominal pain. She reports being in her USOH until 2 weeks ago when she began having intermittent upper abdominal pain associated with nausea and vomiting. She denied diarrhea, hematemesis, hematochezia, melena, fever or chills. She presented to the ED tonight and CT of the abdomen and pelvis showed presence of preexisting parastomal hernia without evidence of bowel obstruction.     Patient seen by surgery. Abdominal pain now resolved. Tolerated clear liquid diet, advanced to soft low-fiber diet. Plan for discharge with outpatient surgery clinic followup    Pertinent Physical Exam At Time of Discharge  Gen: Elderly adult female, no acute distress  HEENT: Normocephalic, atraumatic, good dentition, no oral lesions appreciated  Neck: No cervical lymphadenopathy appreciated, no thyroid enlargement appreciated  CV: No limb edema appreciated  Resp: No increased work of breathing, no ronchi, rales or wheezes appreciated  Abdomen: soft, nontender, nondistended, no guarding, no masses appreciated. Ostomy bag shows soft brown  contents, no ad blood appreciated  MSK: No joint swelling appreciated, full ROM  Psych: appropriate mood and affect    Outpatient Follow-Up  Future Appointments   Date Time Provider Department Center   10/12/2023 10:15 AM Ambar Guzman MD [id number] [facility]         David Amaral MD

## 2023-10-11 PROBLEM — K57.90 DIVERTICULOSIS OF INTESTINE: Status: ACTIVE | Noted: 2023-10-11

## 2023-10-11 PROBLEM — Z43.3 ATTENTION TO COLOSTOMY (MULTI): Status: ACTIVE | Noted: 2023-10-11

## 2023-10-11 PROBLEM — K43.5 PARASTOMAL HERNIA WITHOUT OBSTRUCTION OR GANGRENE: Status: ACTIVE | Noted: 2023-10-11

## 2023-10-11 PROBLEM — M81.0 OSTEOPOROSIS: Status: ACTIVE | Noted: 2023-10-11

## 2023-10-11 PROBLEM — I42.9 CARDIOMYOPATHY (MULTI): Status: ACTIVE | Noted: 2023-10-11

## 2023-10-11 PROBLEM — E27.8 ADRENAL MASS (MULTI): Status: ACTIVE | Noted: 2023-10-11

## 2023-10-11 PROBLEM — K57.92 DIVERTICULITIS: Status: ACTIVE | Noted: 2023-10-11

## 2023-10-11 PROBLEM — N95.0 POSTMENOPAUSAL BLEEDING: Status: ACTIVE | Noted: 2023-10-11

## 2023-10-11 PROBLEM — M54.16 LUMBAR RADICULITIS: Status: ACTIVE | Noted: 2023-10-11

## 2023-10-11 PROBLEM — I10 BENIGN ESSENTIAL HYPERTENSION: Status: ACTIVE | Noted: 2023-10-11

## 2023-10-11 PROBLEM — M16.11 PRIMARY LOCALIZED OSTEOARTHRITIS OF RIGHT HIP: Status: ACTIVE | Noted: 2023-10-11

## 2023-10-11 PROBLEM — R01.1 MURMUR: Status: ACTIVE | Noted: 2023-10-11

## 2023-10-11 PROBLEM — I48.0 PAROXYSMAL ATRIAL FIBRILLATION (MULTI): Status: ACTIVE | Noted: 2023-10-11

## 2023-10-11 PROBLEM — N82.4 COLOVAGINAL FISTULA: Status: ACTIVE | Noted: 2023-10-11

## 2023-10-11 PROBLEM — M51.26 LUMBAR DISC DISPLACEMENT WITHOUT MYELOPATHY: Status: ACTIVE | Noted: 2023-10-11

## 2023-10-11 PROBLEM — M46.1 SACROILIITIS (CMS-HCC): Status: ACTIVE | Noted: 2023-10-11

## 2023-10-11 PROBLEM — M47.816 LUMBAR SPONDYLOSIS: Status: ACTIVE | Noted: 2023-10-11

## 2023-10-11 PROBLEM — E87.1 HYPONATREMIA: Status: ACTIVE | Noted: 2023-10-11

## 2023-10-11 PROBLEM — M79.89 LEG SWELLING: Status: ACTIVE | Noted: 2023-10-11

## 2023-10-11 RX ORDER — ASPIRIN 81 MG
100 TABLET, DELAYED RELEASE (ENTERIC COATED) ORAL 2 TIMES DAILY
COMMUNITY
End: 2023-10-23

## 2023-10-11 RX ORDER — CALCIUM CARBONATE 300MG(750)
TABLET,CHEWABLE ORAL
COMMUNITY
Start: 2019-05-13

## 2023-10-11 RX ORDER — TAMARIND SEED/TURMERIC EXTRACT 250 MG
1 TABLET ORAL DAILY
COMMUNITY
Start: 2022-11-10 | End: 2023-10-23 | Stop reason: SDUPTHER

## 2023-10-11 RX ORDER — TRAMADOL HYDROCHLORIDE 50 MG/1
TABLET ORAL
Status: ON HOLD | COMMUNITY
Start: 2016-02-12 | End: 2023-11-07 | Stop reason: ALTCHOICE

## 2023-10-11 RX ORDER — FUROSEMIDE 40 MG/1
1 TABLET ORAL DAILY
COMMUNITY
Start: 2022-06-24 | End: 2023-10-23 | Stop reason: SDUPTHER

## 2023-10-11 RX ORDER — POLYETHYLENE GLYCOL 3350 17 G/17G
POWDER, FOR SOLUTION ORAL
COMMUNITY
Start: 2016-02-10 | End: 2023-10-23

## 2023-10-11 RX ORDER — METOPROLOL SUCCINATE 25 MG/1
1 TABLET, EXTENDED RELEASE ORAL DAILY
COMMUNITY
Start: 2018-07-03 | End: 2023-10-23

## 2023-10-11 RX ORDER — GABAPENTIN 100 MG/1
100 CAPSULE ORAL NIGHTLY
COMMUNITY
Start: 2021-09-03 | End: 2023-10-23

## 2023-10-11 RX ORDER — ESTRADIOL 0.1 MG/G
CREAM VAGINAL
COMMUNITY
Start: 2015-11-23 | End: 2023-10-23 | Stop reason: ALTCHOICE

## 2023-10-11 RX ORDER — HYDROCODONE BITARTRATE AND ACETAMINOPHEN 5; 325 MG/1; MG/1
TABLET ORAL
COMMUNITY
Start: 2016-02-10 | End: 2023-10-23 | Stop reason: ALTCHOICE

## 2023-10-11 RX ORDER — POLYETHYLENE GLYCOL 3350, SODIUM SULFATE ANHYDROUS, SODIUM BICARBONATE, SODIUM CHLORIDE, POTASSIUM CHLORIDE 236; 22.74; 6.74; 5.86; 2.97 G/4L; G/4L; G/4L; G/4L; G/4L
POWDER, FOR SOLUTION ORAL
COMMUNITY
Start: 2016-01-21 | End: 2023-10-23

## 2023-10-11 RX ORDER — OXYCODONE HYDROCHLORIDE 5 MG/1
TABLET ORAL
COMMUNITY
Start: 2016-02-07 | End: 2023-10-23 | Stop reason: ALTCHOICE

## 2023-10-11 RX ORDER — CALCIUM CARBONATE/VITAMIN D3 600 MG-10
TABLET ORAL
COMMUNITY

## 2023-10-11 RX ORDER — ONDANSETRON 4 MG/1
TABLET, FILM COATED ORAL
COMMUNITY
Start: 2016-02-10 | End: 2023-10-23

## 2023-10-11 RX ORDER — MULTIVITAMIN
TABLET ORAL
COMMUNITY

## 2023-10-11 RX ORDER — ASPIRIN 81 MG/1
1 TABLET ORAL DAILY
COMMUNITY
End: 2023-10-23

## 2023-10-11 RX ORDER — TRIAMTERENE AND HYDROCHLOROTHIAZIDE 75; 50 MG/1; MG/1
1 TABLET ORAL DAILY
COMMUNITY
Start: 2008-01-23 | End: 2023-10-23

## 2023-10-11 RX ORDER — TURMERIC 100 %
POWDER (GRAM) MISCELLANEOUS
COMMUNITY
Start: 2022-11-10 | End: 2023-10-23

## 2023-10-11 RX ORDER — HYDROCHLOROTHIAZIDE 25 MG/1
TABLET ORAL
COMMUNITY
Start: 2016-01-26 | End: 2023-10-23

## 2023-10-11 RX ORDER — POTASSIUM CHLORIDE 750 MG/1
1 TABLET, FILM COATED, EXTENDED RELEASE ORAL DAILY
COMMUNITY
Start: 2022-05-25 | End: 2024-04-29 | Stop reason: WASHOUT

## 2023-10-12 ENCOUNTER — OFFICE VISIT (OUTPATIENT)
Dept: SURGERY | Facility: CLINIC | Age: 83
End: 2023-10-12
Payer: MEDICARE

## 2023-10-12 ENCOUNTER — APPOINTMENT (OUTPATIENT)
Dept: SURGERY | Facility: CLINIC | Age: 83
End: 2023-10-12
Payer: MEDICARE

## 2023-10-12 VITALS
TEMPERATURE: 97.1 F | BODY MASS INDEX: 33.01 KG/M2 | RESPIRATION RATE: 16 BRPM | DIASTOLIC BLOOD PRESSURE: 72 MMHG | SYSTOLIC BLOOD PRESSURE: 151 MMHG | HEART RATE: 71 BPM | WEIGHT: 179.4 LBS | HEIGHT: 62 IN

## 2023-10-12 DIAGNOSIS — K43.5 PARASTOMAL HERNIA WITHOUT OBSTRUCTION OR GANGRENE: Primary | ICD-10-CM

## 2023-10-12 PROCEDURE — 3078F DIAST BP <80 MM HG: CPT | Performed by: SURGERY

## 2023-10-12 PROCEDURE — 99214 OFFICE O/P EST MOD 30 MIN: CPT | Performed by: SURGERY

## 2023-10-12 PROCEDURE — 1159F MED LIST DOCD IN RCRD: CPT | Performed by: SURGERY

## 2023-10-12 PROCEDURE — 1126F AMNT PAIN NOTED NONE PRSNT: CPT | Performed by: SURGERY

## 2023-10-12 PROCEDURE — 1036F TOBACCO NON-USER: CPT | Performed by: SURGERY

## 2023-10-12 PROCEDURE — 3077F SYST BP >= 140 MM HG: CPT | Performed by: SURGERY

## 2023-10-12 NOTE — PROGRESS NOTES
Subjective   Patient ID: Sunita Luther is a 83 y.o. female who presents for Follow-up (ABDOMINAL PAIN).  HPI  This is a pleasant patient who is a sister of Jamaal De La Garza.  She was recently hospitalized about a week ago just overnight for an episode of abdominal pain nausea and vomiting.  She was found on CAT scan not to have a complete obstruction but she does have a very large ventral hernia and a even larger parastomal hernia.  Her colostomy is a permanent colostomy she apparently had some sort of rectal intervention years ago and ended up with a colovaginal fistula and on a colonoscopy in 2016 this was found to be unrepairable therefore she had a abdominal perineal resection and a permanent colostomy.  She has had colonoscopy as recently as 2018.  The patient states that over the past few days she had a recurrent episode of some abdominal pain it was mostly in the lower abdomen just below her colostomy and then she had an episode of vomiting which seem to relieve the pain and she has been better since.  She continues to take Metamucil.  She states that MiraLAX sometimes makes her bowels a little too loose.  Her colostomy has continued to function somewhat throughout most of this.  She is 83 she has some high blood pressure and high cholesterol.  She has been seen by cardiology in the past and is in good health according to the patient.  She had a cardiac catheterization in 2018.  She also is still very active.  She does not smoke or drink alcohol.    Review of Systems  10 point review is otherwise negative  Objective   Physical Exam  Head is normocephalic atraumatic the patient does wear glasses lungs are clear bilaterally heart is regular rate and rhythm neck is supple.  Examination of the abdomen just reveals some generalized fullness in the midline and the little bit of the upper abdomen and then below the colostomy there also is some fullness and today a little bit of tenderness.  Extremities do not reveal any  gross deformities.    Assessment/Plan   I had a long conversation with the patient regarding her CAT scan results the size of the hernia and what it would take to fix the hernia.  In essence this is not a laparoscopic procedure or a minimally invasive procedure.  This will require an open operation probably take several hours hopefully we will be able to reduce the parastomal hernia without taking down the original colostomy and have to need to move it.  We will place a hopefully a retrorectus piece of mesh encompassing not only the site of the colostomy but also the midline hernia as well.  Our hope is to again place this in the retrorectus position and use a synthetic mesh and have his low recurrence rate as possible.  She will likely be in the hospital for about a week afterwards with a nasogastric tube a Jacome for a few days as well.  She will then need to recover for 6 to 8 weeks.  This is not pleasant and sounding to sister however she also cannot continue I believe to put up with these episodes of nausea and vomiting.  I think that 1 of these days the parastomal hernia will actually become incarcerated and/or strangulated and I would hate for this to be an emergency procedure.  She will get back to me with her decision.

## 2023-10-23 ENCOUNTER — OFFICE VISIT (OUTPATIENT)
Dept: CARDIOLOGY | Facility: HOSPITAL | Age: 83
End: 2023-10-23
Payer: MEDICARE

## 2023-10-23 VITALS
DIASTOLIC BLOOD PRESSURE: 77 MMHG | WEIGHT: 180.12 LBS | OXYGEN SATURATION: 96 % | BODY MASS INDEX: 32.94 KG/M2 | SYSTOLIC BLOOD PRESSURE: 155 MMHG | HEART RATE: 71 BPM

## 2023-10-23 DIAGNOSIS — I10 HYPERTENSION, UNSPECIFIED TYPE: Primary | ICD-10-CM

## 2023-10-23 DIAGNOSIS — I50.9 CONGESTIVE HEART FAILURE, UNSPECIFIED HF CHRONICITY, UNSPECIFIED HEART FAILURE TYPE (MULTI): ICD-10-CM

## 2023-10-23 PROCEDURE — 1036F TOBACCO NON-USER: CPT | Performed by: NURSE PRACTITIONER

## 2023-10-23 PROCEDURE — 99213 OFFICE O/P EST LOW 20 MIN: CPT | Performed by: NURSE PRACTITIONER

## 2023-10-23 PROCEDURE — 1126F AMNT PAIN NOTED NONE PRSNT: CPT | Performed by: NURSE PRACTITIONER

## 2023-10-23 PROCEDURE — 1160F RVW MEDS BY RX/DR IN RCRD: CPT | Performed by: NURSE PRACTITIONER

## 2023-10-23 PROCEDURE — 3077F SYST BP >= 140 MM HG: CPT | Performed by: NURSE PRACTITIONER

## 2023-10-23 PROCEDURE — 1159F MED LIST DOCD IN RCRD: CPT | Performed by: NURSE PRACTITIONER

## 2023-10-23 PROCEDURE — 3078F DIAST BP <80 MM HG: CPT | Performed by: NURSE PRACTITIONER

## 2023-10-23 ASSESSMENT — ENCOUNTER SYMPTOMS
NEUROLOGICAL NEGATIVE: 1
EYES NEGATIVE: 1
DEPRESSION: 0
ENDOCRINE NEGATIVE: 1
ABDOMINAL PAIN: 1
CONSTITUTIONAL NEGATIVE: 1
PSYCHIATRIC NEGATIVE: 1
VOMITING: 1
RESPIRATORY NEGATIVE: 1
CARDIOVASCULAR NEGATIVE: 1

## 2023-10-23 NOTE — H&P (VIEW-ONLY)
Referred by Dr. Angelique phillips. provider found for Pre-op Clearance (Hernia repair with Dr. Ambar Guzman)     History Of Present Illness:    Sunita Luther is a very pleasant 83 year old female with a stressed induced cardiomyopathy, LVEF has recovered and HTN, she is here for a follow up visit. The patient is seen in collaboration with Dr. Huynh. Sister Homa is scheduled for hernia surgery. She denies chest pain or shortness of breath. She is able to walk up stairs without dyspnea. She complains of abdominal pain.     Review of Systems   Constitutional: Negative.   HENT: Negative.     Eyes: Negative.    Cardiovascular: Negative.    Respiratory: Negative.     Endocrine: Negative.    Skin: Negative.    Musculoskeletal:  Positive for arthritis and muscle weakness.   Gastrointestinal:  Positive for abdominal pain and vomiting.   Neurological: Negative.    Psychiatric/Behavioral: Negative.          Past Medical History:  She has a past medical history of Cardiomyopathy (CMS/HCC), Colovaginal fistula, Diverticulosis, Lumbar disc displacement without myelopathy, Lumbar radiculitis, Lumbar spondylosis, Osteoarthritis of right hip, Osteoporosis, Parastomal hernia without obstruction or gangrene, Personal history of other diseases of the circulatory system (07/18/2014), and Postmenopausal bleeding.    Past Surgical History:  She has a past surgical history that includes Hemorrhoid surgery (02/24/2014); Varicose vein surgery (02/24/2014); Breast lumpectomy (02/24/2014); Hysterectomy (10/27/2015); and Other surgical history (12/18/2015).      Social History:  She reports that she has never smoked. She has never been exposed to tobacco smoke. She has never used smokeless tobacco. She reports that she does not drink alcohol and does not use drugs.    Family History:  Family History   Problem Relation Name Age of Onset    Hypertension Mother      Heart attack Mother      Stroke Father      Lung cancer Father           Allergies:  Hydrocodone-acetaminophen, Oxycodone, and Bactrim [sulfamethoxazole-trimethoprim]    Outpatient Medications:  Current Outpatient Medications   Medication Instructions    atorvastatin (LIPITOR) 20 mg, oral, Daily    furosemide (LASIX) 20 mg, oral, Daily    losartan (COZAAR) 50 mg, oral, 2 times daily    magnesium oxide (Mag-Ox) 400 mg tablet Magnesium 400 MG Oral Tablet   Refills: 0        Start : 13-May-2019   Active    melatonin 1 mg, Mouth/Throat, Daily    multivitamin tablet Multi-Vitamin TABS   Refills: 0       Active    omega-3 fatty acids-fish oil (One-Per-Day Omega-3) 684-1,200 mg capsule Omega 3 CAPS   Refills: 0       Active    potassium chloride CR 10 mEq ER tablet 1 tablet, oral, Daily    psyllium (Metamucil) 3.4 gram packet 1 packet, oral, Daily    traMADol (Ultram) 50 mg tablet         Last Recorded Vitals:  Vitals:    10/23/23 1553   BP: 155/77   Pulse: 71   SpO2: 96%   Weight: 81.7 kg (180 lb 1.9 oz)       Physical Exam:  Physical Exam  Vitals reviewed.   HENT:      Head: Normocephalic.      Nose: Nose normal.   Eyes:      Pupils: Pupils are equal, round, and reactive to light.   Cardiovascular:      Rate and Rhythm: Normal rate and regular rhythm.   Pulmonary:      Effort: Pulmonary effort is normal.      Breath sounds: Normal breath sounds.   Abdominal:      General: Abdomen is flat.      Palpations: Abdomen is soft.   Musculoskeletal:         General: Normal range of motion.      Cervical back: Normal range of motion.   Skin:     General: Skin is warm and dry.   Neurological:      General: No focal deficit present.      Mental Status: He is alert and oriented to person, place, and time.   Psychiatric:         Mood and Affect: Mood normal.            Last Labs:  CBC -  Lab Results   Component Value Date    WBC 6.5 10/08/2023    HGB 13.3 10/08/2023    HCT 41.3 10/08/2023    MCV 93 10/08/2023     10/08/2023       CMP -  Lab Results   Component Value Date    CALCIUM 8.7 10/08/2023     PHOS 3.9 04/19/2022    PROT 6.9 10/07/2023    ALBUMIN 4.5 10/07/2023    AST 17 10/07/2023    ALT 13 10/07/2023    ALKPHOS 66 10/07/2023    BILITOT 0.6 10/07/2023       LIPID PANEL -   Lab Results   Component Value Date    CHOL 153 09/12/2023    TRIG 99 09/12/2023    HDL 65.3 09/12/2023    CHHDL 2.3 09/12/2023    LDLF 68 09/12/2023    VLDL 20 09/12/2023       RENAL FUNCTION PANEL -   Lab Results   Component Value Date    GLUCOSE 97 10/08/2023     10/08/2023    K 3.8 10/08/2023     10/08/2023    CO2 26 10/08/2023    ANIONGAP 14 10/08/2023    BUN 13 10/08/2023    CREATININE 0.61 10/08/2023    CALCIUM 8.7 10/08/2023    PHOS 3.9 04/19/2022    ALBUMIN 4.5 10/07/2023        Lab Results   Component Value Date    BNP 66 04/19/2022    HGBA1C 5.9 (A) 06/06/2023       Last Cardiology Tests:  ECG:  EKG independently reviewed from 10/7/2023 sinus bradycardia first degree AV block heart rate 55 bpm     Echo:  Echocardiogram 12/2022  1. Left ventricular systolic function is normal with a 60-65% estimated ejection fraction.   2. Spectral Doppler shows an impaired relaxation pattern of left ventricular diastolic filling.   3. Mild to moderate aortic valve regurgitation.   4. There is mild mitral and tricuspid regurgitation.  Echocardiogram 10/2018   1. The left ventricular systolic function is normal with a 60-65% estimated ejection fraction.   2. Spectral Doppler shows an impaired relaxation pattern of left ventricular diastolic filling.   3. Concentric left ventricular hypertrophy.     Echocardiogram 7/2018   1. The left ventricular systolic function is moderately decreased with a 35% estimated ejection fraction.   2. Entire apex is abnormal.   3. Spectral Doppler shows an impaired relaxation pattern of left ventricular diastolic filling.   4. There is moderate aortic valve regurgitation.   5. There is mild mitral, tricuspid, and pulmonic regurgitation.   6. The estimated pulmonary artery pressure is mildly elevated  with the RVSP at 42.7 mmHg.   7. There is a trivial pericardial effusion.    Echocardiogram 6/2018  1. The left ventricular systolic function is normal with a 55-60% estimated ejection fraction.   2. Spectral Doppler shows an impaired relaxation pattern of left ventricular diastolic filling.   3. There is mild to moderate aortic valve regurgitation.     Ejection Fractions:  Lvef 60-65%  Cath:  [facility] 6/1/2018   1. Minimal nonobstructive CAD in a right dominant circulation.   2. No significant LV-AO peak to peak pullback gradient.   3. Left Ventricular end-diastolic pressure = 27.      Stress Test:    Cardiac Imaging:      Assessment/Plan   Very pleasant 83 year old female with a past medical history of stress induced cardiomyopathy with recovered LVEF, [facility] 2018 with non obstructive disease, hypertension, colostomy 2016, she is here for cardiac clearance for hernia surgery. She is able to walk up the stairs without dyspnea. She is low cardiac risk for surgery, may proceed to the OR without further cardiac testing. Heart rate and blood pressure is well controlled today.       Plan:  Low cardiac risk for surgery   Continue Lasix, Atorvastatin and Losartan   Follow up in April       Stephanie Ivan, APRN-CNP

## 2023-10-23 NOTE — PROGRESS NOTES
Referred by Dr. Angelique phillips. provider found for Pre-op Clearance (Hernia repair with Dr. Ambar Guzman)     History Of Present Illness:    Sunita Luther is a very pleasant 83 year old female with a stressed induced cardiomyopathy, LVEF has recovered and HTN, she is here for a follow up visit. The patient is seen in collaboration with Dr. Huynh. Sister Homa is scheduled for hernia surgery. She denies chest pain or shortness of breath. She is able to walk up stairs without dyspnea. She complains of abdominal pain.     Review of Systems   Constitutional: Negative.   HENT: Negative.     Eyes: Negative.    Cardiovascular: Negative.    Respiratory: Negative.     Endocrine: Negative.    Skin: Negative.    Musculoskeletal:  Positive for arthritis and muscle weakness.   Gastrointestinal:  Positive for abdominal pain and vomiting.   Neurological: Negative.    Psychiatric/Behavioral: Negative.          Past Medical History:  She has a past medical history of Cardiomyopathy (CMS/HCC), Colovaginal fistula, Diverticulosis, Lumbar disc displacement without myelopathy, Lumbar radiculitis, Lumbar spondylosis, Osteoarthritis of right hip, Osteoporosis, Parastomal hernia without obstruction or gangrene, Personal history of other diseases of the circulatory system (07/18/2014), and Postmenopausal bleeding.    Past Surgical History:  She has a past surgical history that includes Hemorrhoid surgery (02/24/2014); Varicose vein surgery (02/24/2014); Breast lumpectomy (02/24/2014); Hysterectomy (10/27/2015); and Other surgical history (12/18/2015).      Social History:  She reports that she has never smoked. She has never been exposed to tobacco smoke. She has never used smokeless tobacco. She reports that she does not drink alcohol and does not use drugs.    Family History:  Family History   Problem Relation Name Age of Onset    Hypertension Mother      Heart attack Mother      Stroke Father      Lung cancer Father           Allergies:  Hydrocodone-acetaminophen, Oxycodone, and Bactrim [sulfamethoxazole-trimethoprim]    Outpatient Medications:  Current Outpatient Medications   Medication Instructions    atorvastatin (LIPITOR) 20 mg, oral, Daily    furosemide (LASIX) 20 mg, oral, Daily    losartan (COZAAR) 50 mg, oral, 2 times daily    magnesium oxide (Mag-Ox) 400 mg tablet Magnesium 400 MG Oral Tablet   Refills: 0        Start : 13-May-2019   Active    melatonin 1 mg, Mouth/Throat, Daily    multivitamin tablet Multi-Vitamin TABS   Refills: 0       Active    omega-3 fatty acids-fish oil (One-Per-Day Omega-3) 684-1,200 mg capsule Omega 3 CAPS   Refills: 0       Active    potassium chloride CR 10 mEq ER tablet 1 tablet, oral, Daily    psyllium (Metamucil) 3.4 gram packet 1 packet, oral, Daily    traMADol (Ultram) 50 mg tablet         Last Recorded Vitals:  Vitals:    10/23/23 1553   BP: 155/77   Pulse: 71   SpO2: 96%   Weight: 81.7 kg (180 lb 1.9 oz)       Physical Exam:  Physical Exam  Vitals reviewed.   HENT:      Head: Normocephalic.      Nose: Nose normal.   Eyes:      Pupils: Pupils are equal, round, and reactive to light.   Cardiovascular:      Rate and Rhythm: Normal rate and regular rhythm.   Pulmonary:      Effort: Pulmonary effort is normal.      Breath sounds: Normal breath sounds.   Abdominal:      General: Abdomen is flat.      Palpations: Abdomen is soft.   Musculoskeletal:         General: Normal range of motion.      Cervical back: Normal range of motion.   Skin:     General: Skin is warm and dry.   Neurological:      General: No focal deficit present.      Mental Status: He is alert and oriented to person, place, and time.   Psychiatric:         Mood and Affect: Mood normal.            Last Labs:  CBC -  Lab Results   Component Value Date    WBC 6.5 10/08/2023    HGB 13.3 10/08/2023    HCT 41.3 10/08/2023    MCV 93 10/08/2023     10/08/2023       CMP -  Lab Results   Component Value Date    CALCIUM 8.7 10/08/2023     PHOS 3.9 04/19/2022    PROT 6.9 10/07/2023    ALBUMIN 4.5 10/07/2023    AST 17 10/07/2023    ALT 13 10/07/2023    ALKPHOS 66 10/07/2023    BILITOT 0.6 10/07/2023       LIPID PANEL -   Lab Results   Component Value Date    CHOL 153 09/12/2023    TRIG 99 09/12/2023    HDL 65.3 09/12/2023    CHHDL 2.3 09/12/2023    LDLF 68 09/12/2023    VLDL 20 09/12/2023       RENAL FUNCTION PANEL -   Lab Results   Component Value Date    GLUCOSE 97 10/08/2023     10/08/2023    K 3.8 10/08/2023     10/08/2023    CO2 26 10/08/2023    ANIONGAP 14 10/08/2023    BUN 13 10/08/2023    CREATININE 0.61 10/08/2023    CALCIUM 8.7 10/08/2023    PHOS 3.9 04/19/2022    ALBUMIN 4.5 10/07/2023        Lab Results   Component Value Date    BNP 66 04/19/2022    HGBA1C 5.9 (A) 06/06/2023       Last Cardiology Tests:  ECG:  EKG independently reviewed from 10/7/2023 sinus bradycardia first degree AV block heart rate 55 bpm     Echo:  Echocardiogram 12/2022  1. Left ventricular systolic function is normal with a 60-65% estimated ejection fraction.   2. Spectral Doppler shows an impaired relaxation pattern of left ventricular diastolic filling.   3. Mild to moderate aortic valve regurgitation.   4. There is mild mitral and tricuspid regurgitation.  Echocardiogram 10/2018   1. The left ventricular systolic function is normal with a 60-65% estimated ejection fraction.   2. Spectral Doppler shows an impaired relaxation pattern of left ventricular diastolic filling.   3. Concentric left ventricular hypertrophy.     Echocardiogram 7/2018   1. The left ventricular systolic function is moderately decreased with a 35% estimated ejection fraction.   2. Entire apex is abnormal.   3. Spectral Doppler shows an impaired relaxation pattern of left ventricular diastolic filling.   4. There is moderate aortic valve regurgitation.   5. There is mild mitral, tricuspid, and pulmonic regurgitation.   6. The estimated pulmonary artery pressure is mildly elevated  with the RVSP at 42.7 mmHg.   7. There is a trivial pericardial effusion.    Echocardiogram 6/2018  1. The left ventricular systolic function is normal with a 55-60% estimated ejection fraction.   2. Spectral Doppler shows an impaired relaxation pattern of left ventricular diastolic filling.   3. There is mild to moderate aortic valve regurgitation.     Ejection Fractions:  Lvef 60-65%  Cath:  [facility] 6/1/2018   1. Minimal nonobstructive CAD in a right dominant circulation.   2. No significant LV-AO peak to peak pullback gradient.   3. Left Ventricular end-diastolic pressure = 27.      Stress Test:    Cardiac Imaging:      Assessment/Plan   Very pleasant 83 year old female with a past medical history of stress induced cardiomyopathy with recovered LVEF, [facility] 2018 with non obstructive disease, hypertension, colostomy 2016, she is here for cardiac clearance for hernia surgery. She is able to walk up the stairs without dyspnea. She is low cardiac risk for surgery, may proceed to the OR without further cardiac testing. Heart rate and blood pressure is well controlled today.       Plan:  Low cardiac risk for surgery   Continue Lasix, Atorvastatin and Losartan   Follow up in April       Stephanie Ivan, APRN-CNP

## 2023-10-26 LAB — HOLD SPECIMEN: NORMAL

## 2023-10-30 PROBLEM — K43.2 VENTRAL INCISIONAL HERNIA: Status: ACTIVE | Noted: 2023-10-30

## 2023-11-02 ENCOUNTER — ANESTHESIA EVENT (OUTPATIENT)
Dept: OPERATING ROOM | Facility: HOSPITAL | Age: 83
DRG: 354 | End: 2023-11-02
Payer: MEDICARE

## 2023-11-06 RX ORDER — LIDOCAINE HYDROCHLORIDE 10 MG/ML
0.1 INJECTION, SOLUTION EPIDURAL; INFILTRATION; INTRACAUDAL; PERINEURAL ONCE
Status: CANCELLED | OUTPATIENT
Start: 2023-11-06 | End: 2023-11-06

## 2023-11-06 RX ORDER — LABETALOL HYDROCHLORIDE 5 MG/ML
5 INJECTION, SOLUTION INTRAVENOUS ONCE AS NEEDED
Status: CANCELLED | OUTPATIENT
Start: 2023-11-06

## 2023-11-06 RX ORDER — ACETAMINOPHEN 325 MG/1
650 TABLET ORAL EVERY 4 HOURS PRN
Status: CANCELLED | OUTPATIENT
Start: 2023-11-06

## 2023-11-06 RX ORDER — SODIUM CHLORIDE, SODIUM LACTATE, POTASSIUM CHLORIDE, CALCIUM CHLORIDE 600; 310; 30; 20 MG/100ML; MG/100ML; MG/100ML; MG/100ML
100 INJECTION, SOLUTION INTRAVENOUS CONTINUOUS
Status: CANCELLED | OUTPATIENT
Start: 2023-11-06

## 2023-11-07 ENCOUNTER — ANESTHESIA (OUTPATIENT)
Dept: OPERATING ROOM | Facility: HOSPITAL | Age: 83
DRG: 354 | End: 2023-11-07
Payer: MEDICARE

## 2023-11-07 ENCOUNTER — HOSPITAL ENCOUNTER (INPATIENT)
Facility: HOSPITAL | Age: 83
LOS: 7 days | Discharge: HOME | DRG: 354 | End: 2023-11-14
Attending: SURGERY | Admitting: SURGERY
Payer: MEDICARE

## 2023-11-07 ENCOUNTER — APPOINTMENT (OUTPATIENT)
Dept: RADIOLOGY | Facility: HOSPITAL | Age: 83
DRG: 354 | End: 2023-11-07
Payer: MEDICARE

## 2023-11-07 DIAGNOSIS — Z87.19 S/P REPAIR OF VENTRAL HERNIA: ICD-10-CM

## 2023-11-07 DIAGNOSIS — K43.5 PARASTOMAL HERNIA WITHOUT OBSTRUCTION OR GANGRENE: ICD-10-CM

## 2023-11-07 DIAGNOSIS — K43.2 VENTRAL INCISIONAL HERNIA: Primary | ICD-10-CM

## 2023-11-07 DIAGNOSIS — K43.9 VENTRAL HERNIA WITHOUT OBSTRUCTION OR GANGRENE: ICD-10-CM

## 2023-11-07 DIAGNOSIS — I10 HYPERTENSION, UNSPECIFIED TYPE: ICD-10-CM

## 2023-11-07 DIAGNOSIS — Z98.890 S/P REPAIR OF VENTRAL HERNIA: ICD-10-CM

## 2023-11-07 PROCEDURE — 49621 RPR PARASTOMAL HERNIA RDC: CPT | Performed by: SURGERY

## 2023-11-07 PROCEDURE — 2500000005 HC RX 250 GENERAL PHARMACY W/O HCPCS: Performed by: NURSE ANESTHETIST, CERTIFIED REGISTERED

## 2023-11-07 PROCEDURE — 2500000005 HC RX 250 GENERAL PHARMACY W/O HCPCS: Performed by: SURGERY

## 2023-11-07 PROCEDURE — 2500000004 HC RX 250 GENERAL PHARMACY W/ HCPCS (ALT 636 FOR OP/ED): Performed by: ANESTHESIOLOGY

## 2023-11-07 PROCEDURE — 3700000001 HC GENERAL ANESTHESIA TIME - INITIAL BASE CHARGE: Performed by: SURGERY

## 2023-11-07 PROCEDURE — 7100000002 HC RECOVERY ROOM TIME - EACH INCREMENTAL 1 MINUTE: Performed by: SURGERY

## 2023-11-07 PROCEDURE — 2500000004 HC RX 250 GENERAL PHARMACY W/ HCPCS (ALT 636 FOR OP/ED): Performed by: SURGERY

## 2023-11-07 PROCEDURE — 3600000003 HC OR TIME - INITIAL BASE CHARGE - PROCEDURE LEVEL THREE: Performed by: SURGERY

## 2023-11-07 PROCEDURE — 2780000003 HC OR 278 NO HCPCS: Performed by: SURGERY

## 2023-11-07 PROCEDURE — 2500000001 HC RX 250 WO HCPCS SELF ADMINISTERED DRUGS (ALT 637 FOR MEDICARE OP): Performed by: INTERNAL MEDICINE

## 2023-11-07 PROCEDURE — 3700000002 HC GENERAL ANESTHESIA TIME - EACH INCREMENTAL 1 MINUTE: Performed by: SURGERY

## 2023-11-07 PROCEDURE — 2500000004 HC RX 250 GENERAL PHARMACY W/ HCPCS (ALT 636 FOR OP/ED): Performed by: NURSE ANESTHETIST, CERTIFIED REGISTERED

## 2023-11-07 PROCEDURE — 1100000001 HC PRIVATE ROOM DAILY

## 2023-11-07 PROCEDURE — 74018 RADEX ABDOMEN 1 VIEW: CPT | Performed by: RADIOLOGY

## 2023-11-07 PROCEDURE — 0WUF0JZ SUPPLEMENT ABDOMINAL WALL WITH SYNTHETIC SUBSTITUTE, OPEN APPROACH: ICD-10-PCS | Performed by: SURGERY

## 2023-11-07 PROCEDURE — 9420000001 HC RT PATIENT EDUCATION 5 MIN

## 2023-11-07 PROCEDURE — 7100000001 HC RECOVERY ROOM TIME - INITIAL BASE CHARGE: Performed by: SURGERY

## 2023-11-07 PROCEDURE — 3600000008 HC OR TIME - EACH INCREMENTAL 1 MINUTE - PROCEDURE LEVEL THREE: Performed by: SURGERY

## 2023-11-07 PROCEDURE — A49593 PR RPR AA HERNIA 1ST 3-10 CM REDUCIBLE: Performed by: NURSE ANESTHETIST, CERTIFIED REGISTERED

## 2023-11-07 PROCEDURE — 74018 RADEX ABDOMEN 1 VIEW: CPT

## 2023-11-07 PROCEDURE — 2720000007 HC OR 272 NO HCPCS: Performed by: SURGERY

## 2023-11-07 PROCEDURE — 49591 RPR AA HRN 1ST < 3 CM RDC: CPT | Performed by: SURGERY

## 2023-11-07 DEVICE — IMPLANTABLE DEVICE: Type: IMPLANTABLE DEVICE | Site: ABDOMEN | Status: FUNCTIONAL

## 2023-11-07 RX ORDER — LIDOCAINE HYDROCHLORIDE 10 MG/ML
INJECTION, SOLUTION EPIDURAL; INFILTRATION; INTRACAUDAL; PERINEURAL AS NEEDED
Status: DISCONTINUED | OUTPATIENT
Start: 2023-11-07 | End: 2023-11-07

## 2023-11-07 RX ORDER — HYDROMORPHONE HYDROCHLORIDE 2 MG/ML
INJECTION, SOLUTION INTRAMUSCULAR; INTRAVENOUS; SUBCUTANEOUS AS NEEDED
Status: DISCONTINUED | OUTPATIENT
Start: 2023-11-07 | End: 2023-11-07

## 2023-11-07 RX ORDER — ONDANSETRON HYDROCHLORIDE 2 MG/ML
INJECTION, SOLUTION INTRAVENOUS AS NEEDED
Status: DISCONTINUED | OUTPATIENT
Start: 2023-11-07 | End: 2023-11-07

## 2023-11-07 RX ORDER — MORPHINE SULFATE 2 MG/ML
2 INJECTION, SOLUTION INTRAMUSCULAR; INTRAVENOUS
Status: DISCONTINUED | OUTPATIENT
Start: 2023-11-07 | End: 2023-11-12

## 2023-11-07 RX ORDER — SODIUM CHLORIDE, SODIUM LACTATE, POTASSIUM CHLORIDE, CALCIUM CHLORIDE 600; 310; 30; 20 MG/100ML; MG/100ML; MG/100ML; MG/100ML
100 INJECTION, SOLUTION INTRAVENOUS CONTINUOUS
Status: DISCONTINUED | OUTPATIENT
Start: 2023-11-07 | End: 2023-11-08

## 2023-11-07 RX ORDER — NORETHINDRONE AND ETHINYL ESTRADIOL 0.5-0.035
KIT ORAL AS NEEDED
Status: DISCONTINUED | OUTPATIENT
Start: 2023-11-07 | End: 2023-11-07

## 2023-11-07 RX ORDER — LOSARTAN POTASSIUM 50 MG/1
50 TABLET ORAL 2 TIMES DAILY
Status: DISCONTINUED | OUTPATIENT
Start: 2023-11-07 | End: 2023-11-14 | Stop reason: HOSPADM

## 2023-11-07 RX ORDER — LIDOCAINE HYDROCHLORIDE 40 MG/ML
INJECTION, SOLUTION RETROBULBAR AS NEEDED
Status: DISCONTINUED | OUTPATIENT
Start: 2023-11-07 | End: 2023-11-07

## 2023-11-07 RX ORDER — PROPOFOL 10 MG/ML
INJECTION, EMULSION INTRAVENOUS AS NEEDED
Status: DISCONTINUED | OUTPATIENT
Start: 2023-11-07 | End: 2023-11-07

## 2023-11-07 RX ORDER — DROPERIDOL 2.5 MG/ML
0.62 INJECTION, SOLUTION INTRAMUSCULAR; INTRAVENOUS ONCE AS NEEDED
Status: DISCONTINUED | OUTPATIENT
Start: 2023-11-07 | End: 2023-11-07 | Stop reason: HOSPADM

## 2023-11-07 RX ORDER — PANTOPRAZOLE SODIUM 40 MG/10ML
40 INJECTION, POWDER, LYOPHILIZED, FOR SOLUTION INTRAVENOUS
Status: DISCONTINUED | OUTPATIENT
Start: 2023-11-08 | End: 2023-11-14 | Stop reason: HOSPADM

## 2023-11-07 RX ORDER — PROPOFOL 10 MG/ML
INJECTION, EMULSION INTRAVENOUS CONTINUOUS PRN
Status: DISCONTINUED | OUTPATIENT
Start: 2023-11-07 | End: 2023-11-07

## 2023-11-07 RX ORDER — SODIUM CHLORIDE, SODIUM LACTATE, POTASSIUM CHLORIDE, CALCIUM CHLORIDE 600; 310; 30; 20 MG/100ML; MG/100ML; MG/100ML; MG/100ML
100 INJECTION, SOLUTION INTRAVENOUS CONTINUOUS
Status: DISCONTINUED | OUTPATIENT
Start: 2023-11-07 | End: 2023-11-07 | Stop reason: HOSPADM

## 2023-11-07 RX ORDER — CEFAZOLIN SODIUM 2 G/100ML
2 INJECTION, SOLUTION INTRAVENOUS ONCE
Status: COMPLETED | OUTPATIENT
Start: 2023-11-07 | End: 2023-11-07

## 2023-11-07 RX ORDER — FENTANYL CITRATE 50 UG/ML
INJECTION, SOLUTION INTRAMUSCULAR; INTRAVENOUS AS NEEDED
Status: DISCONTINUED | OUTPATIENT
Start: 2023-11-07 | End: 2023-11-07

## 2023-11-07 RX ORDER — MORPHINE SULFATE 4 MG/ML
4 INJECTION, SOLUTION INTRAMUSCULAR; INTRAVENOUS EVERY 4 HOURS PRN
Status: DISCONTINUED | OUTPATIENT
Start: 2023-11-07 | End: 2023-11-12

## 2023-11-07 RX ORDER — DEXAMETHASONE SODIUM PHOSPHATE 4 MG/ML
INJECTION, SOLUTION INTRA-ARTICULAR; INTRALESIONAL; INTRAMUSCULAR; INTRAVENOUS; SOFT TISSUE AS NEEDED
Status: DISCONTINUED | OUTPATIENT
Start: 2023-11-07 | End: 2023-11-07

## 2023-11-07 RX ORDER — NALOXONE HYDROCHLORIDE 0.4 MG/ML
0.2 INJECTION, SOLUTION INTRAMUSCULAR; INTRAVENOUS; SUBCUTANEOUS EVERY 5 MIN PRN
Status: DISCONTINUED | OUTPATIENT
Start: 2023-11-07 | End: 2023-11-12

## 2023-11-07 RX ORDER — BUPIVACAINE HYDROCHLORIDE 5 MG/ML
INJECTION, SOLUTION PERINEURAL AS NEEDED
Status: DISCONTINUED | OUTPATIENT
Start: 2023-11-07 | End: 2023-11-07 | Stop reason: HOSPADM

## 2023-11-07 RX ORDER — ROCURONIUM BROMIDE 10 MG/ML
INJECTION, SOLUTION INTRAVENOUS AS NEEDED
Status: DISCONTINUED | OUTPATIENT
Start: 2023-11-07 | End: 2023-11-07

## 2023-11-07 RX ORDER — ONDANSETRON HYDROCHLORIDE 2 MG/ML
4 INJECTION, SOLUTION INTRAVENOUS EVERY 4 HOURS PRN
Status: DISCONTINUED | OUTPATIENT
Start: 2023-11-07 | End: 2023-11-10

## 2023-11-07 RX ORDER — ONDANSETRON HYDROCHLORIDE 2 MG/ML
4 INJECTION, SOLUTION INTRAVENOUS ONCE AS NEEDED
Status: DISCONTINUED | OUTPATIENT
Start: 2023-11-07 | End: 2023-11-07 | Stop reason: HOSPADM

## 2023-11-07 RX ORDER — CEFAZOLIN SODIUM 2 G/100ML
1 INJECTION, SOLUTION INTRAVENOUS EVERY 8 HOURS
Status: DISCONTINUED | OUTPATIENT
Start: 2023-11-08 | End: 2023-11-08

## 2023-11-07 RX ADMIN — SUGAMMADEX 50 MG: 100 INJECTION, SOLUTION INTRAVENOUS at 14:40

## 2023-11-07 RX ADMIN — DEXAMETHASONE SODIUM PHOSPHATE 8 MG: 4 INJECTION, SOLUTION INTRAMUSCULAR; INTRAVENOUS at 10:56

## 2023-11-07 RX ADMIN — CEFAZOLIN SODIUM 2 G: 2 INJECTION, SOLUTION INTRAVENOUS at 14:12

## 2023-11-07 RX ADMIN — ROCURONIUM BROMIDE 70 MG: 10 INJECTION, SOLUTION INTRAVENOUS at 10:22

## 2023-11-07 RX ADMIN — HYDROMORPHONE HYDROCHLORIDE 0.2 MG: 2 INJECTION, SOLUTION INTRAMUSCULAR; INTRAVENOUS; SUBCUTANEOUS at 13:43

## 2023-11-07 RX ADMIN — PROPOFOL 20 MG: 10 INJECTION, EMULSION INTRAVENOUS at 14:42

## 2023-11-07 RX ADMIN — LIDOCAINE HYDROCHLORIDE 3 ML: 40 INJECTION, SOLUTION RETROBULBAR; TOPICAL at 10:24

## 2023-11-07 RX ADMIN — PROPOFOL 25 MCG/KG/MIN: 10 INJECTION, EMULSION INTRAVENOUS at 10:25

## 2023-11-07 RX ADMIN — SUGAMMADEX 50 MG: 100 INJECTION, SOLUTION INTRAVENOUS at 14:26

## 2023-11-07 RX ADMIN — CEFAZOLIN SODIUM 2 G: 2 INJECTION, SOLUTION INTRAVENOUS at 10:15

## 2023-11-07 RX ADMIN — PROPOFOL 20 MG: 10 INJECTION, EMULSION INTRAVENOUS at 14:55

## 2023-11-07 RX ADMIN — ROCURONIUM BROMIDE 20 MG: 10 INJECTION, SOLUTION INTRAVENOUS at 11:06

## 2023-11-07 RX ADMIN — ONDANSETRON 4 MG: 2 INJECTION INTRAMUSCULAR; INTRAVENOUS at 18:53

## 2023-11-07 RX ADMIN — HYDROMORPHONE HYDROCHLORIDE 0.5 MG: 1 INJECTION, SOLUTION INTRAMUSCULAR; INTRAVENOUS; SUBCUTANEOUS at 15:53

## 2023-11-07 RX ADMIN — FENTANYL CITRATE 25 MCG: 50 INJECTION, SOLUTION INTRAMUSCULAR; INTRAVENOUS at 10:22

## 2023-11-07 RX ADMIN — FENTANYL CITRATE 25 MCG: 50 INJECTION, SOLUTION INTRAMUSCULAR; INTRAVENOUS at 10:09

## 2023-11-07 RX ADMIN — SODIUM CHLORIDE, POTASSIUM CHLORIDE, SODIUM LACTATE AND CALCIUM CHLORIDE 100 ML/HR: 600; 310; 30; 20 INJECTION, SOLUTION INTRAVENOUS at 09:51

## 2023-11-07 RX ADMIN — SODIUM CHLORIDE, POTASSIUM CHLORIDE, SODIUM LACTATE AND CALCIUM CHLORIDE 100 ML/HR: 600; 310; 30; 20 INJECTION, SOLUTION INTRAVENOUS at 15:54

## 2023-11-07 RX ADMIN — HYDROMORPHONE HYDROCHLORIDE 0.2 MG: 2 INJECTION, SOLUTION INTRAMUSCULAR; INTRAVENOUS; SUBCUTANEOUS at 12:16

## 2023-11-07 RX ADMIN — PROPOFOL 30 MG: 10 INJECTION, EMULSION INTRAVENOUS at 14:38

## 2023-11-07 RX ADMIN — ONDANSETRON 4 MG: 2 INJECTION, SOLUTION INTRAMUSCULAR; INTRAVENOUS at 10:00

## 2023-11-07 RX ADMIN — ONDANSETRON 4 MG: 2 INJECTION, SOLUTION INTRAMUSCULAR; INTRAVENOUS at 14:21

## 2023-11-07 RX ADMIN — LOSARTAN POTASSIUM 50 MG: 50 TABLET, FILM COATED ORAL at 22:46

## 2023-11-07 RX ADMIN — SUGAMMADEX 100 MG: 100 INJECTION, SOLUTION INTRAVENOUS at 14:56

## 2023-11-07 RX ADMIN — FENTANYL CITRATE 50 MCG: 50 INJECTION, SOLUTION INTRAMUSCULAR; INTRAVENOUS at 10:36

## 2023-11-07 RX ADMIN — HYDROMORPHONE HYDROCHLORIDE 0.5 MG: 1 INJECTION, SOLUTION INTRAMUSCULAR; INTRAVENOUS; SUBCUTANEOUS at 16:21

## 2023-11-07 RX ADMIN — ROCURONIUM BROMIDE 20 MG: 10 INJECTION, SOLUTION INTRAVENOUS at 13:05

## 2023-11-07 RX ADMIN — PROPOFOL 110 MG: 10 INJECTION, EMULSION INTRAVENOUS at 10:22

## 2023-11-07 RX ADMIN — PROPOFOL 20 MG: 10 INJECTION, EMULSION INTRAVENOUS at 14:26

## 2023-11-07 RX ADMIN — MORPHINE SULFATE 2 MG: 2 INJECTION, SOLUTION INTRAMUSCULAR; INTRAVENOUS at 18:50

## 2023-11-07 RX ADMIN — HYDROMORPHONE HYDROCHLORIDE 0.2 MG: 2 INJECTION, SOLUTION INTRAMUSCULAR; INTRAVENOUS; SUBCUTANEOUS at 11:41

## 2023-11-07 RX ADMIN — HYDROMORPHONE HYDROCHLORIDE 0.2 MG: 2 INJECTION, SOLUTION INTRAMUSCULAR; INTRAVENOUS; SUBCUTANEOUS at 15:02

## 2023-11-07 RX ADMIN — EPHEDRINE SULFATE 15 MG: 50 INJECTION, SOLUTION INTRAVENOUS at 13:03

## 2023-11-07 RX ADMIN — MORPHINE SULFATE 2 MG: 2 INJECTION, SOLUTION INTRAMUSCULAR; INTRAVENOUS at 22:46

## 2023-11-07 RX ADMIN — HYDROMORPHONE HYDROCHLORIDE 0.2 MG: 2 INJECTION, SOLUTION INTRAMUSCULAR; INTRAVENOUS; SUBCUTANEOUS at 11:06

## 2023-11-07 RX ADMIN — ROCURONIUM BROMIDE 20 MG: 10 INJECTION, SOLUTION INTRAVENOUS at 13:48

## 2023-11-07 RX ADMIN — ROCURONIUM BROMIDE 20 MG: 10 INJECTION, SOLUTION INTRAVENOUS at 11:41

## 2023-11-07 RX ADMIN — ROCURONIUM BROMIDE 30 MG: 10 INJECTION, SOLUTION INTRAVENOUS at 10:36

## 2023-11-07 RX ADMIN — HYDROMORPHONE HYDROCHLORIDE 0.5 MG: 1 INJECTION, SOLUTION INTRAMUSCULAR; INTRAVENOUS; SUBCUTANEOUS at 15:36

## 2023-11-07 RX ADMIN — SODIUM CHLORIDE, POTASSIUM CHLORIDE, SODIUM LACTATE AND CALCIUM CHLORIDE: 600; 310; 30; 20 INJECTION, SOLUTION INTRAVENOUS at 14:41

## 2023-11-07 RX ADMIN — SODIUM CHLORIDE, POTASSIUM CHLORIDE, SODIUM LACTATE AND CALCIUM CHLORIDE: 600; 310; 30; 20 INJECTION, SOLUTION INTRAVENOUS at 10:40

## 2023-11-07 RX ADMIN — ONDANSETRON 4 MG: 2 INJECTION INTRAMUSCULAR; INTRAVENOUS at 23:15

## 2023-11-07 RX ADMIN — SODIUM CHLORIDE, POTASSIUM CHLORIDE, SODIUM LACTATE AND CALCIUM CHLORIDE 100 ML/HR: 600; 310; 30; 20 INJECTION, SOLUTION INTRAVENOUS at 18:30

## 2023-11-07 RX ADMIN — LIDOCAINE HYDROCHLORIDE 50 MG: 10 INJECTION, SOLUTION EPIDURAL; INFILTRATION; INTRACAUDAL; PERINEURAL at 10:22

## 2023-11-07 SDOH — SOCIAL STABILITY: SOCIAL INSECURITY: HAS ANYONE EVER THREATENED TO HURT YOUR FAMILY OR YOUR PETS?: NO

## 2023-11-07 SDOH — SOCIAL STABILITY: SOCIAL INSECURITY: DOES ANYONE TRY TO KEEP YOU FROM HAVING/CONTACTING OTHER FRIENDS OR DOING THINGS OUTSIDE YOUR HOME?: NO

## 2023-11-07 SDOH — SOCIAL STABILITY: SOCIAL INSECURITY: DO YOU FEEL ANYONE HAS EXPLOITED OR TAKEN ADVANTAGE OF YOU FINANCIALLY OR OF YOUR PERSONAL PROPERTY?: NO

## 2023-11-07 SDOH — SOCIAL STABILITY: SOCIAL INSECURITY: ARE YOU OR HAVE YOU BEEN THREATENED OR ABUSED PHYSICALLY, EMOTIONALLY, OR SEXUALLY BY ANYONE?: NO

## 2023-11-07 SDOH — SOCIAL STABILITY: SOCIAL INSECURITY: WERE YOU ABLE TO COMPLETE ALL THE BEHAVIORAL HEALTH SCREENINGS?: YES

## 2023-11-07 SDOH — SOCIAL STABILITY: SOCIAL INSECURITY: ARE THERE ANY APPARENT SIGNS OF INJURIES/BEHAVIORS THAT COULD BE RELATED TO ABUSE/NEGLECT?: NO

## 2023-11-07 SDOH — SOCIAL STABILITY: SOCIAL INSECURITY: DO YOU FEEL UNSAFE GOING BACK TO THE PLACE WHERE YOU ARE LIVING?: NO

## 2023-11-07 SDOH — HEALTH STABILITY: MENTAL HEALTH: CURRENT SMOKER: 0

## 2023-11-07 SDOH — SOCIAL STABILITY: SOCIAL INSECURITY: ABUSE: ADULT

## 2023-11-07 SDOH — SOCIAL STABILITY: SOCIAL INSECURITY: HAVE YOU HAD THOUGHTS OF HARMING ANYONE ELSE?: NO

## 2023-11-07 ASSESSMENT — PATIENT HEALTH QUESTIONNAIRE - PHQ9
2. FEELING DOWN, DEPRESSED OR HOPELESS: NOT AT ALL
1. LITTLE INTEREST OR PLEASURE IN DOING THINGS: NOT AT ALL
SUM OF ALL RESPONSES TO PHQ9 QUESTIONS 1 & 2: 0

## 2023-11-07 ASSESSMENT — COLUMBIA-SUICIDE SEVERITY RATING SCALE - C-SSRS
2. HAVE YOU ACTUALLY HAD ANY THOUGHTS OF KILLING YOURSELF?: NO
6. HAVE YOU EVER DONE ANYTHING, STARTED TO DO ANYTHING, OR PREPARED TO DO ANYTHING TO END YOUR LIFE?: NO
1. IN THE PAST MONTH, HAVE YOU WISHED YOU WERE DEAD OR WISHED YOU COULD GO TO SLEEP AND NOT WAKE UP?: NO

## 2023-11-07 ASSESSMENT — ACTIVITIES OF DAILY LIVING (ADL)
WALKS IN HOME: INDEPENDENT
JUDGMENT_ADEQUATE_SAFELY_COMPLETE_DAILY_ACTIVITIES: YES
HEARING - LEFT EAR: FUNCTIONAL
ASSISTIVE_DEVICE: WALKER
FEEDING YOURSELF: INDEPENDENT
ADEQUATE_TO_COMPLETE_ADL: YES
HEARING - RIGHT EAR: FUNCTIONAL
LACK_OF_TRANSPORTATION: NO
GROOMING: NEEDS ASSISTANCE
PATIENT'S MEMORY ADEQUATE TO SAFELY COMPLETE DAILY ACTIVITIES?: YES
TOILETING: NEEDS ASSISTANCE
DRESSING YOURSELF: NEEDS ASSISTANCE
LACK_OF_TRANSPORTATION: NO
BATHING: NEEDS ASSISTANCE

## 2023-11-07 ASSESSMENT — COGNITIVE AND FUNCTIONAL STATUS - GENERAL
TOILETING: A LITTLE
MOBILITY SCORE: 20
STANDING UP FROM CHAIR USING ARMS: A LITTLE
CLIMB 3 TO 5 STEPS WITH RAILING: A LITTLE
MOVING TO AND FROM BED TO CHAIR: A LITTLE
WALKING IN HOSPITAL ROOM: A LITTLE
DRESSING REGULAR LOWER BODY CLOTHING: A LITTLE
PATIENT BASELINE BEDBOUND: YES
HELP NEEDED FOR BATHING: A LITTLE
DAILY ACTIVITIY SCORE: 21

## 2023-11-07 ASSESSMENT — PAIN - FUNCTIONAL ASSESSMENT
PAIN_FUNCTIONAL_ASSESSMENT: 0-10

## 2023-11-07 ASSESSMENT — LIFESTYLE VARIABLES
AUDIT-C TOTAL SCORE: 0
HOW MANY STANDARD DRINKS CONTAINING ALCOHOL DO YOU HAVE ON A TYPICAL DAY: PATIENT DOES NOT DRINK
HOW OFTEN DO YOU HAVE 6 OR MORE DRINKS ON ONE OCCASION: NEVER
SKIP TO QUESTIONS 9-10: 1
AUDIT-C TOTAL SCORE: 0
HOW OFTEN DO YOU HAVE A DRINK CONTAINING ALCOHOL: NEVER

## 2023-11-07 ASSESSMENT — PAIN SCALES - GENERAL
PAINLEVEL_OUTOF10: 7
PAINLEVEL_OUTOF10: 7
PAINLEVEL_OUTOF10: 8
PAINLEVEL_OUTOF10: 6
PAINLEVEL_OUTOF10: 5 - MODERATE PAIN
PAINLEVEL_OUTOF10: 7
PAINLEVEL_OUTOF10: 4
PAINLEVEL_OUTOF10: 7

## 2023-11-07 NOTE — ANESTHESIA PREPROCEDURE EVALUATION
Patient: Sunita Luther    Procedure Information       Date/Time: 11/07/23 1030    Procedure: VENTRAL HERNIA REPAIR - retrorectus mesh  repair parastomal hernia    Location: [facility] / [facility]    Surgeons: Ambar Guzman MD     Vitals:    11/07/23 0922   BP: (!) 186/76   Pulse: 63   Resp: 18   Temp: 36.1 °C (97 °F)   SpO2: 99%       Past Surgical History:   Procedure Laterality Date   • BREAST LUMPECTOMY  02/24/2014    Breast Surgery Lumpectomy   • HEMORRHOID SURGERY  02/24/2014    Hemorrhoidectomy   • HYSTERECTOMY  10/27/2015    Hysterectomy   • OTHER SURGICAL HISTORY  12/18/2015    Excision Of Rectal Procidentia Perineal Approach   • VARICOSE VEIN SURGERY  02/24/2014    Varicose Vein Ligation     Past Medical History:   Diagnosis Date   • Cardiomyopathy (CMS/HCC)    • Class 1 obesity with body mass index (BMI) of 32.0 to 32.9 in adult    • Colovaginal fistula    • Diverticulosis    • Encounter for pre-operative cardiovascular clearance 10/23/2023    Dr DANIELLE Huynh/SHONDA [place] for Ventral Hernia Repair (See NOTES & MEDIA Tabs)   • Lumbar disc displacement without myelopathy    • Lumbar radiculitis    • Lumbar spondylosis    • Osteoarthritis of right hip    • Osteoporosis    • Parastomal hernia without obstruction or gangrene    • Paroxysmal atrial fibrillation (CMS/HCC)    • Personal history of other diseases of the circulatory system 07/18/2014    History of hypertension   • Postmenopausal bleeding        Current Facility-Administered Medications:   •  lactated Ringer's infusion, 100 mL/hr, intravenous, Continuous, Alin Hood MD, Last Rate: 100 mL/hr at 11/07/23 1040, New Bag at 11/07/23 1040    Facility-Administered Medications Ordered in Other Encounters:   •  dexAMETHasone (Decadron) injection, , intravenous, PRN, Solange Mathews APRN-CRNA, 8 mg at 11/07/23 1056  •  fentaNYL PF (Sublimaze) injection, , intravenous, PRN, Solange Mathews APRN-CRNA, 50 mcg at 11/07/23 1036  •  HYDROmorphone (Dilaudid)  injection, , intravenous, PRN, Solange RYLEY Mathews, APRN-CRNA, 0.2 mg at 11/07/23 1106  •  lidocaine PF (Xylocaine) 10 mg/mL (1 %) injection, , miscellaneous, PRN, Solange L Love, APRN-CRNA, 50 mg at 11/07/23 1022  •  lidocaine PF (Xylocaine) 40 mg/mL (4 %) injection, , Topical, PRN, Solange RYLEY Mathews, APRN-CRNA, 3 mL at 11/07/23 1024  •  ondansetron (Zofran) injection, , intravenous, PRN, Solange RYLEY Mathews, APRN-CRNA, 4 mg at 11/07/23 1000  •  propofol (Diprivan) infusion, , intravenous, Continuous PRN, Solange RYLEY Mathews APRN-CRNA, Last Rate: 12.15 mL/hr at 11/07/23 1025, 25 mcg/kg/min at 11/07/23 1025  •  propofol (Diprivan) injection, , intravenous, PRN, Solange RYLEY Mathews, APRN-CRNA, 110 mg at 11/07/23 1022  •  rocuronium (ZeMuron) injection, , intravenous, PRN, Solange L Love, APRN-CRNA, 20 mg at 11/07/23 1106  Prior to Admission medications    Medication Sig Start Date End Date Taking? Authorizing Provider   atorvastatin (Lipitor) 10 mg tablet Take 2 tablets (20 mg) by mouth once daily.   Yes Historical Provider, MD   furosemide (Lasix) 20 mg tablet Take 1 tablet (20 mg) by mouth once daily.   Yes Historical Provider, MD   losartan (Cozaar) 50 mg tablet Take 1 tablet (50 mg) by mouth 2 times a day.   Yes Historical Provider, MD   magnesium oxide (Mag-Ox) 400 mg tablet Magnesium 400 MG Oral Tablet   Refills: 0        Start : 13-May-2019   Active 5/13/19  Yes Historical Provider, MD   melatonin 1 mg tablet, sublingual Use 1 mg in the mouth or throat once daily.   Yes Historical Provider, MD   multivitamin tablet Multi-Vitamin TABS   Refills: 0       Active   Yes Historical Provider, MD   omega-3 fatty acids-fish oil (One-Per-Day Omega-3) 684-1,200 mg capsule Omega 3 CAPS   Refills: 0       Active   Yes Historical Provider, MD   potassium chloride CR 10 mEq ER tablet Take 1 tablet (10 mEq) by mouth once daily. 5/25/22  Yes Historical Provider, MD   psyllium (Metamucil) 3.4 gram packet Take 1 packet by mouth once daily.   Yes Historical  Provider, MD   traMADol (Ultram) 50 mg tablet  2/12/16 11/7/23  Historical Provider, MD     Allergies   Allergen Reactions   • Hydrocodone-Acetaminophen Unknown   • Oxycodone Unknown   • Bactrim [Sulfamethoxazole-Trimethoprim] Rash     Social History     Tobacco Use   • Smoking status: Never     Passive exposure: Never   • Smokeless tobacco: Never   Substance Use Topics   • Alcohol use: Never         Chemistry    Lab Results   Component Value Date/Time     10/08/2023 0634    K 3.8 10/08/2023 0634     10/08/2023 0634    CO2 26 10/08/2023 0634    BUN 13 10/08/2023 0634    CREATININE 0.61 10/08/2023 0634    Lab Results   Component Value Date/Time    CALCIUM 8.7 10/08/2023 0634    ALKPHOS 66 10/07/2023 1810    AST 17 10/07/2023 1810    ALT 13 10/07/2023 1810    BILITOT 0.6 10/07/2023 1810          Lab Results   Component Value Date/Time    WBC 6.5 10/08/2023 0634    HGB 13.3 10/08/2023 0634    HCT 41.3 10/08/2023 0634     10/08/2023 0634     Lab Results   Component Value Date/Time    PROTIME 12.5 07/01/2018 0923    INR 1.2 (H) 07/01/2018 0923     No results found for this or any previous visit (from the past 4464 hour(s)).  No results found for this or any previous visit from the past 1095 days.        Relevant Problems   Cardiovascular   (+) Benign essential hypertension   (+) Murmur   (+) Paroxysmal atrial fibrillation (CMS/HCC)      Endocrine   (+) Hyponatremia      Neuro/Psych   (+) Lumbar radiculitis      Musculoskeletal   (+) Lumbar disc displacement without myelopathy   (+) Lumbar spondylosis   (+) Primary localized osteoarthritis of right hip      Other   (+) Sacroiliitis (CMS/HCC)       Clinical information reviewed:   Tobacco  Allergies  Meds   Med Hx  Surg Hx  OB Status  Fam Hx  Soc   Hx        NPO Detail:  NPO/Void Status  Carbonhydrate Drink Given Prior to Surgery? : N  Date of Last Liquid: 11/06/23  Time of Last Liquid: 2300  Date of Last Solid: 11/06/23  Time of Last Solid:  2300  Last Intake Type: Clear fluids  Time of Last Void: 0830         Physical Exam    Airway  Mallampati: III  TM distance: >3 FB  Neck ROM: full     Cardiovascular - normal exam  Rhythm: irregular  Rate: normal     Dental   (+) upper dentures     Pulmonary - normal exam  Breath sounds clear to auscultation     Abdominal - normal exam  Abdomen: soft  Bowel sounds: normal         Anesthesia Plan    ASA 3     general     The patient is not a current smoker.  Patient was not previously instructed to abstain from smoking on day of procedure.  Patient did not smoke on day of procedure.    intravenous induction   Postoperative administration of opioids is intended.  Anesthetic plan and risks discussed with patient.  Use of blood products discussed with patient who consented to blood products.    Plan discussed with attending.

## 2023-11-07 NOTE — ANESTHESIA POSTPROCEDURE EVALUATION
Patient: Sunita Luther    Procedure Summary       Date: 11/07/23 Room / Location: [facility] / [facility]    Anesthesia Start: 1000 Anesthesia Stop: 1525    Procedure: VENTRAL HERNIA REPAIR Diagnosis:       Ventral hernia without obstruction or gangrene      Parastomal hernia without obstruction or gangrene      (ventral hernia)      (parastomal hernia)    Surgeons: Ambar Guzman MD Responsible Provider: CAIN Fuentes    Anesthesia Type: general ASA Status: 3            Anesthesia Type: general    Vitals Value Taken Time   /71 11/07/23 1545   Temp 36 °C (96.8 °F) 11/07/23 1530   Pulse 75 11/07/23 1553   Resp 14 11/07/23 1554   SpO2 96 % 11/07/23 1553   Vitals shown include unvalidated device data.    Anesthesia Post Evaluation    No notable events documented.

## 2023-11-07 NOTE — OP NOTE
VENTRAL HERNIA REPAIR Operative Note     Date: 2023  OR Location: [facility] OR    Name: Sunita Luther, : 1940, Age: 83 y.o., MRN: [id number], Sex: female    Diagnosis  Pre-op Diagnosis     * Ventral hernia without obstruction or gangrene [K43.9]     * Parastomal hernia without obstruction or gangrene [K43.5] Post-op Diagnosis     * Ventral hernia without obstruction or gangrene [K43.9]     * Parastomal hernia without obstruction or gangrene [K43.5]     Procedures  VENTRAL HERNIA REPAIR  11218 - TN RPR AA HERNIA 1ST < 3 CM REDUCIBLE      Surgeons      * Ambar Guzman - Primary    Resident/Fellow/Other Assistant:  Surgeon(s) and Role:    Procedure Summary  Anesthesia: Consult  ASA: III  Anesthesia Staff: CRNA: Solange Mathews, APRN-CRNA; Alannah Álvarez APRN-CRNA  Estimated Blood Loss: 20mL  Intra-op Medications:   Medication Name Total Dose   lactated Ringer's infusion 383.33 mL   ceFAZolin in dextrose (iso-os) (Ancef) IVPB 2 g 2 g              Anesthesia Record               Intraprocedure I/O Totals          Intake    Propofol Drip 0.00 mL    The total shown is the total volume documented since Anesthesia Start was filed.    lactated Ringer's infusion 1000.00 mL    ceFAZolin in dextrose (iso-os) (Ancef) IVPB 2 g 100.00 mL    Total Intake 1100 mL       Output    Urine 200 mL    Total Output 200 mL       Net    Net Volume 900 mL          Specimen: No specimens collected     Staff:   Circulator: Анна Mccoy RN; Faraz Lozada RN  Relief Circulator: Daniella Servin RN  Relief Scrub: Arlene Romo RN; Aracely Kelley RN  Scrub Person: Barb Cabrera         Drains and/or Catheters:   Urethral Catheter Non-latex 16 Fr. (Active)       Tourniquet Times:         Implants:  Implants       Type Name Action Serial No.      Graft GRAFT, STRATTICE, ABDOMINAL, 16 X 20 CM, FIRM - [id number] Implanted               Findings: hernias    Indications: Sunita Luther is an 83 y.o. female who is having  surgery for ventral hernia  parastomal hernia.     The patient was seen in the preoperative area. The risks, benefits, complications, treatment options, non-operative alternatives, expected recovery and outcomes were discussed with the patient. The possibilities of reaction to medication, pulmonary aspiration, injury to surrounding structures, bleeding, recurrent infection, the need for additional procedures, failure to diagnose a condition, and creating a complication requiring transfusion or operation were discussed with the patient. The patient concurred with the proposed plan, giving informed consent.  The site of surgery was properly noted/marked if necessary per policy. The patient has been actively warmed in preoperative area. Preoperative antibiotics have been ordered and given within 1 hours of incision. Venous thrombosis prophylaxis have been ordered including bilateral sequential compression devices    Procedure Details: Patient was brought to the operating theater and placed on the operating table in the supine position after sufficient general anesthesia was administered the patient's abdomen was prepped and draped in usual sterile manner we also had cleaned off the colostomy placed a 4 x 4 and a Tegaderm with Cavilon in order to keep that area away from the wound.  Midline incision was made and carried down through the subcutaneous tissue using cautery the patient had a large ventral hernia above the umbilicus she also had a smaller hernia inferiorly and her hernia just above the symphysis.  These areas were carefully dissected.  The patient also had a large parastomal hernia the contents were reduced and most of the dissection then centered around reducing that hernia sac and trying to identify some fascial edges that could be closed around the colostomy.  We were hopeful that we would not have to move the colostomy or do anything else to that part of the abdomen.  The hernia sac was reduced the  fascial edges were very wide apart in that part of the abdomen and there was not very good tissue there but we did reapproximate some of the fascia around the colostomy in order to keep that reduced the peritoneum was also reapproximated so that the small bowel would be kept away from the mesh itself.  We had created a layer of preperitoneal layer to place the mesh our hope was to do a retrorectus repair however the patient had such damage tissue and scar tissue in the left side of the abdomen that that was really not possible.  Once we had the tissue planes dissected and enough laterally on both sides to place the large mesh we selected a 20 x 16 cm Stratus mesh and curved to the corners and then placed this above the peritoneum the peritoneal layer had been closed using a running 0 PDS suture.  The mesh had sutures placed in the 4 corners and also superiorly and inferiorly in the midline we then made incisions very laterally on both sides and used a Kenji Lozoya needle to gain access to the preperitoneal space grab the 2 ends of the suture and pulled that through separately so that we could tie a knot full-thickness of the abdominal wall in that region and secured the mesh.  The mesh had to be incised at the ostomy site in order to allow the ostomy to come through and then it was reapproximated to itself laterally using an 0 Prolene suture.  There was good finger breath room around the ostomy around the mesh therefore we did not feel it was too tight but the hernia itself was completely reduced in this fashion.  We placed another stay sutures using 0 Prolene suture and a Kenji Lozoya needle full-thickness through the abdominal wall the dissection was carried down to the symphysis and a few centimeters below the symphysis and a 2-0 Prolene was used to secure to ligamentous tissue just above this region creating a good tight mesh closure.  We irrigated the closure for the anterior sheath was then carried out  using 0 PDS suture 2 of these 1 from the top 1 from the bottom the drain had been placed and left on top of the Stratus mesh and this was brought out through a separate percutaneous incision and secured with a 2-0 Prolene we then irrigated the incision and closed this in several layers using 3-0 Vicryl 4-0 Vicryl 4-0 Vicryl was used for the small incisions that were done for the stay sutures in the colostomy was then treated with an appliance a binder was placed and she was then returned to the recovery area in good condition this Ambar Guzman dictating Sister Sunita Luther  Complications:  None; patient tolerated the procedure well.    Disposition: PACU - hemodynamically stable.  Condition: stable         Additional Details:     Attending Attestation:     Ambar Guzman  Phone Number: [phone]

## 2023-11-07 NOTE — ANESTHESIA PROCEDURE NOTES
Airway  Date/Time: 11/7/2023 10:24 AM  Urgency: elective      Staffing  Performed: CRNA   Authorized by: CAIN Fuentes    Performed by: CAIN Fuentes  Patient location during procedure: OR    Indications and Patient Condition  Indications for airway management: anesthesia  Spontaneous Ventilation: absent  Sedation level: deep  Preoxygenated: yes  Patient position: sniffing  MILS maintained throughout  Mask difficulty assessment: 1 - vent by mask  Planned trial extubation    Final Airway Details  Final airway type: endotracheal airway      Successful airway: ETT  Cuffed: yes   Successful intubation technique: video laryngoscopy  Facilitating devices/methods: intubating stylet  Endotracheal tube insertion site: oral  Blade type: Rodriguez.  ETT size (mm): 3.0  Cormack-Lehane Classification: grade IIa - partial view of glottis  Placement verified by: chest auscultation and capnometry   Cuff volume (mL): 6  Measured from: lips  ETT to lips (cm): 21  Number of attempts at approach: 1  Number of other approaches attempted: 0

## 2023-11-08 LAB
ANION GAP SERPL CALC-SCNC: 13 MMOL/L (ref 10–20)
BUN SERPL-MCNC: 13 MG/DL (ref 6–23)
CALCIUM SERPL-MCNC: 8.2 MG/DL (ref 8.6–10.3)
CHLORIDE SERPL-SCNC: 103 MMOL/L (ref 98–107)
CO2 SERPL-SCNC: 25 MMOL/L (ref 21–32)
CREAT SERPL-MCNC: 0.5 MG/DL (ref 0.5–1.05)
ERYTHROCYTE [DISTWIDTH] IN BLOOD BY AUTOMATED COUNT: 12.9 % (ref 11.5–14.5)
GFR SERPL CREATININE-BSD FRML MDRD: >90 ML/MIN/1.73M*2
GLUCOSE SERPL-MCNC: 115 MG/DL (ref 74–99)
HCT VFR BLD AUTO: 38.2 % (ref 36–46)
HGB BLD-MCNC: 12.2 G/DL (ref 12–16)
MAGNESIUM SERPL-MCNC: 1.73 MG/DL (ref 1.6–2.4)
MCH RBC QN AUTO: 29.5 PG (ref 26–34)
MCHC RBC AUTO-ENTMCNC: 31.9 G/DL (ref 32–36)
MCV RBC AUTO: 92 FL (ref 80–100)
NRBC BLD-RTO: 0 /100 WBCS (ref 0–0)
PHOSPHATE SERPL-MCNC: 4 MG/DL (ref 2.5–4.9)
PLATELET # BLD AUTO: 215 X10*3/UL (ref 150–450)
POTASSIUM SERPL-SCNC: 4.1 MMOL/L (ref 3.5–5.3)
RBC # BLD AUTO: 4.14 X10*6/UL (ref 4–5.2)
SODIUM SERPL-SCNC: 137 MMOL/L (ref 136–145)
WBC # BLD AUTO: 15 X10*3/UL (ref 4.4–11.3)

## 2023-11-08 PROCEDURE — 80048 BASIC METABOLIC PNL TOTAL CA: CPT | Performed by: SURGERY

## 2023-11-08 PROCEDURE — 85027 COMPLETE CBC AUTOMATED: CPT | Performed by: SURGERY

## 2023-11-08 PROCEDURE — 2500000005 HC RX 250 GENERAL PHARMACY W/O HCPCS: Performed by: PHYSICIAN ASSISTANT

## 2023-11-08 PROCEDURE — 36415 COLL VENOUS BLD VENIPUNCTURE: CPT | Performed by: SURGERY

## 2023-11-08 PROCEDURE — 1100000001 HC PRIVATE ROOM DAILY

## 2023-11-08 PROCEDURE — 96372 THER/PROPH/DIAG INJ SC/IM: CPT | Performed by: PHYSICIAN ASSISTANT

## 2023-11-08 PROCEDURE — 83735 ASSAY OF MAGNESIUM: CPT | Performed by: SURGERY

## 2023-11-08 PROCEDURE — 2500000004 HC RX 250 GENERAL PHARMACY W/ HCPCS (ALT 636 FOR OP/ED): Performed by: PHYSICIAN ASSISTANT

## 2023-11-08 PROCEDURE — 84100 ASSAY OF PHOSPHORUS: CPT | Performed by: SURGERY

## 2023-11-08 PROCEDURE — C9113 INJ PANTOPRAZOLE SODIUM, VIA: HCPCS | Performed by: SURGERY

## 2023-11-08 PROCEDURE — 2500000004 HC RX 250 GENERAL PHARMACY W/ HCPCS (ALT 636 FOR OP/ED): Performed by: SURGERY

## 2023-11-08 PROCEDURE — 2500000001 HC RX 250 WO HCPCS SELF ADMINISTERED DRUGS (ALT 637 FOR MEDICARE OP): Performed by: INTERNAL MEDICINE

## 2023-11-08 RX ORDER — TALC
3 POWDER (GRAM) TOPICAL NIGHTLY PRN
Status: DISCONTINUED | OUTPATIENT
Start: 2023-11-08 | End: 2023-11-14 | Stop reason: HOSPADM

## 2023-11-08 RX ORDER — CEFAZOLIN SODIUM 1 G/50ML
1 SOLUTION INTRAVENOUS EVERY 8 HOURS
Status: COMPLETED | OUTPATIENT
Start: 2023-11-08 | End: 2023-11-09

## 2023-11-08 RX ORDER — DEXTROSE MONOHYDRATE, SODIUM CHLORIDE, AND POTASSIUM CHLORIDE 50; 1.49; 4.5 G/1000ML; G/1000ML; G/1000ML
100 INJECTION, SOLUTION INTRAVENOUS CONTINUOUS
Status: DISCONTINUED | OUTPATIENT
Start: 2023-11-08 | End: 2023-11-14 | Stop reason: HOSPADM

## 2023-11-08 RX ORDER — ENOXAPARIN SODIUM 100 MG/ML
40 INJECTION SUBCUTANEOUS DAILY
Status: DISCONTINUED | OUTPATIENT
Start: 2023-11-08 | End: 2023-11-14 | Stop reason: HOSPADM

## 2023-11-08 RX ADMIN — MORPHINE SULFATE 2 MG: 2 INJECTION, SOLUTION INTRAMUSCULAR; INTRAVENOUS at 02:04

## 2023-11-08 RX ADMIN — ONDANSETRON 4 MG: 2 INJECTION INTRAMUSCULAR; INTRAVENOUS at 11:23

## 2023-11-08 RX ADMIN — ENOXAPARIN SODIUM 40 MG: 40 INJECTION SUBCUTANEOUS at 15:47

## 2023-11-08 RX ADMIN — MORPHINE SULFATE 2 MG: 2 INJECTION, SOLUTION INTRAMUSCULAR; INTRAVENOUS at 19:39

## 2023-11-08 RX ADMIN — LOSARTAN POTASSIUM 50 MG: 50 TABLET, FILM COATED ORAL at 09:03

## 2023-11-08 RX ADMIN — LOSARTAN POTASSIUM 50 MG: 50 TABLET, FILM COATED ORAL at 19:40

## 2023-11-08 RX ADMIN — Medication 3 MG: at 21:10

## 2023-11-08 RX ADMIN — MORPHINE SULFATE 2 MG: 2 INJECTION, SOLUTION INTRAMUSCULAR; INTRAVENOUS at 09:03

## 2023-11-08 RX ADMIN — MORPHINE SULFATE 2 MG: 2 INJECTION, SOLUTION INTRAMUSCULAR; INTRAVENOUS at 05:11

## 2023-11-08 RX ADMIN — POTASSIUM CHLORIDE, DEXTROSE MONOHYDRATE AND SODIUM CHLORIDE 100 ML/HR: 150; 5; 450 INJECTION, SOLUTION INTRAVENOUS at 09:05

## 2023-11-08 RX ADMIN — CEFAZOLIN SODIUM 1 G: 1 INJECTION, SOLUTION INTRAVENOUS at 22:17

## 2023-11-08 RX ADMIN — Medication 1.5 L/MIN: at 10:16

## 2023-11-08 RX ADMIN — MORPHINE SULFATE 2 MG: 2 INJECTION, SOLUTION INTRAMUSCULAR; INTRAVENOUS at 12:35

## 2023-11-08 RX ADMIN — PANTOPRAZOLE SODIUM 40 MG: 40 INJECTION, POWDER, FOR SOLUTION INTRAVENOUS at 09:15

## 2023-11-08 RX ADMIN — MORPHINE SULFATE 2 MG: 2 INJECTION, SOLUTION INTRAMUSCULAR; INTRAVENOUS at 15:48

## 2023-11-08 ASSESSMENT — COGNITIVE AND FUNCTIONAL STATUS - GENERAL
CLIMB 3 TO 5 STEPS WITH RAILING: A LITTLE
MOVING TO AND FROM BED TO CHAIR: A LITTLE
DAILY ACTIVITIY SCORE: 21
MOBILITY SCORE: 20
DRESSING REGULAR LOWER BODY CLOTHING: A LITTLE
STANDING UP FROM CHAIR USING ARMS: A LITTLE
STANDING UP FROM CHAIR USING ARMS: A LITTLE
TOILETING: A LITTLE
HELP NEEDED FOR BATHING: A LITTLE
WALKING IN HOSPITAL ROOM: A LITTLE
CLIMB 3 TO 5 STEPS WITH RAILING: A LITTLE
TOILETING: A LITTLE
HELP NEEDED FOR BATHING: A LITTLE
MOVING TO AND FROM BED TO CHAIR: A LITTLE
MOBILITY SCORE: 20
DRESSING REGULAR LOWER BODY CLOTHING: A LITTLE
WALKING IN HOSPITAL ROOM: A LITTLE
DAILY ACTIVITIY SCORE: 21

## 2023-11-08 ASSESSMENT — PAIN SCALES - GENERAL
PAINLEVEL_OUTOF10: 10 - WORST POSSIBLE PAIN
PAINLEVEL_OUTOF10: 4
PAINLEVEL_OUTOF10: 4
PAINLEVEL_OUTOF10: 6
PAINLEVEL_OUTOF10: 9
PAINLEVEL_OUTOF10: 6
PAINLEVEL_OUTOF10: 7

## 2023-11-08 ASSESSMENT — PAIN - FUNCTIONAL ASSESSMENT
PAIN_FUNCTIONAL_ASSESSMENT: 0-10

## 2023-11-08 NOTE — CARE PLAN
Problem: Skin  Goal: Prevent/minimize sheer/friction injuries  Outcome: Progressing   The patient's goals for the shift include      The clinical goals for the shift include pain and nausea control

## 2023-11-08 NOTE — CARE PLAN
The patient's goals for the shift include      The clinical goals for the shift include Pain control and ambulation    Problem: Pain  Goal: Takes deep breaths with improved pain control throughout the shift  11/8/2023 1521 by Petty Alaniz RN  Outcome: Progressing  11/8/2023 1257 by Petty Alaniz RN  Outcome: Progressing  Goal: Turns in bed with improved pain control throughout the shift  Outcome: Progressing  Goal: Walks with improved pain control throughout the shift  Outcome: Progressing     Problem: Fall/Injury  Goal: Not fall by end of shift  11/8/2023 1521 by Petty Alaniz RN  Outcome: Progressing  11/8/2023 1257 by Petty Alaniz RN  Outcome: Progressing     Problem: Pain  Goal: My pain/discomfort is manageable  11/8/2023 1521 by Petty Alaniz RN  Outcome: Progressing  11/8/2023 1257 by Petty Alaniz RN  Outcome: Progressing     Problem: Safety  Goal: Patient will be injury free during hospitalization  11/8/2023 1521 by Petty Alaniz RN  Outcome: Progressing  11/8/2023 1257 by Petty Alaniz RN  Outcome: Progressing     Problem: Indwelling Catheter Maintenance  Goal: I will have no complications from indwelling catheter  11/8/2023 1521 by Petty Alaniz RN  Outcome: Progressing  11/8/2023 1257 by Petty Alaniz RN  Outcome: Progressing

## 2023-11-08 NOTE — CARE PLAN
Problem: Pain  Goal: Takes deep breaths with improved pain control throughout the shift  11/8/2023 0035 by Rand Olivas RN  Outcome: Progressing  11/8/2023 0034 by Rand Olivas RN  Outcome: Progressing  Goal: Turns in bed with improved pain control throughout the shift  Outcome: Progressing     Problem: Fall/Injury  Goal: Not fall by end of shift  11/8/2023 0035 by Rand Olivas RN  Outcome: Progressing  11/8/2023 0034 by Rand Olivas RN  Outcome: Progressing  Goal: Be free from injury by end of the shift  Outcome: Progressing     Problem: Pain  Goal: My pain/discomfort is manageable  Outcome: Progressing     Problem: Safety  Goal: Patient will be injury free during hospitalization  Outcome: Progressing     Problem: Indwelling Catheter Maintenance  Goal: I will have no complications from indwelling catheter  Outcome: Progressing   The patient's goals for the shift include      The clinical goals for the shift include pain and nausea control

## 2023-11-08 NOTE — PROGRESS NOTES
Sunita Luther is a 83 y.o. female s/p Ventral Hernia and Parastomal Hernia Repair (POD#1)    Subjective   Patient is doing well; using IS frequently.      Objective   NGT: minimal  TAMMY Drain: 65 ml.     Physical Exam  Gen: NAD  Eyes:  EOM intact  ENT: MMM  Neck: No JVD  Respiratory: CTAB, no wheezes/rhonchi  Cardiac: RRR, no murmurs rubs or gallops  Abdomen: soft, NT, +BS  Extremities: no edema or cyanosis  Neuro: No focal deficits, alert and oriented x 3  Psych:  appropriate mood and behavior    Last Recorded Vitals  Blood pressure 166/76, pulse 68, temperature 36.7 °C (98.1 °F), temperature source Temporal, resp. rate 18, weight 81 kg (178 lb 9.2 oz), SpO2 93 %.    Intake/Output last 3 Shifts:  I/O last 3 completed shifts:  In: 2300 (28.4 mL/kg) [I.V.:2200 (27.2 mL/kg); IV Piggyback:100]  Out: 1830 (22.6 mL/kg) [Urine:1675 (0.6 mL/kg/hr); Drains:125; Blood:30]  Weight: 81 kg     Relevant Results  Results for orders placed or performed during the hospital encounter of 11/07/23 (from the past 24 hour(s))   CBC   Result Value Ref Range    WBC 15.0 (H) 4.4 - 11.3 x10*3/uL    nRBC 0.0 0.0 - 0.0 /100 WBCs    RBC 4.14 4.00 - 5.20 x10*6/uL    Hemoglobin 12.2 12.0 - 16.0 g/dL    Hematocrit 38.2 36.0 - 46.0 %    MCV 92 80 - 100 fL    MCH 29.5 26.0 - 34.0 pg    MCHC 31.9 (L) 32.0 - 36.0 g/dL    RDW 12.9 11.5 - 14.5 %    Platelets 215 150 - 450 x10*3/uL   Basic metabolic panel   Result Value Ref Range    Glucose 115 (H) 74 - 99 mg/dL    Sodium 137 136 - 145 mmol/L    Potassium 4.1 3.5 - 5.3 mmol/L    Chloride 103 98 - 107 mmol/L    Bicarbonate 25 21 - 32 mmol/L    Anion Gap 13 10 - 20 mmol/L    Urea Nitrogen 13 6 - 23 mg/dL    Creatinine 0.50 0.50 - 1.05 mg/dL    eGFR >90 >60 mL/min/1.73m*2    Calcium 8.2 (L) 8.6 - 10.3 mg/dL   Magnesium   Result Value Ref Range    Magnesium 1.73 1.60 - 2.40 mg/dL   Phosphorus   Result Value Ref Range    Phosphorus 4.0 2.5 - 4.9 mg/dL      Assessment:  - Ventral Hernia and Parastomal Hernia  Repair (POD#1)    Recommendations:  - Maintain NGT until flatus  - begin lovenox at 15:00 pm  - Maintain rothman until she is more mobile/ambulating/frequent IS  - hospitalist service for medical management  - IV morphine as needed  - bmp, mg. In a.m.          I spent 30 minutes in the professional and overall care of this patient.      Reena Gomes PA-C

## 2023-11-08 NOTE — PROGRESS NOTES
11/08/23 1209   Discharge Planning   Living Arrangements Alone;Other (Comment)  ([facility] is from [facility])   Support Systems [employer] [facility]  ([facility])   Assistance Needed Patient is from [facility], lives on the third floor, uses the elevator and uses a rolling walker as needed. Patient is A&O X3 and on room air at baseline.   Type of Residence Assisted living   Do you have animals or pets at home? No   Care Facility Name [facility]   Who is requesting discharge planning? Provider   Home or Post Acute Services Other (Comment)  (If therapy is needed can be provided at [facility] with outpatient prescriptions for PT/OT at time of DC.)   Type of Post Acute Facility Services Assisted living   Patient expects to be discharged to: [facility]   Does the patient need discharge transport arranged? Yes   RoundTrip coordination needed? Yes   Has discharge transport been arranged? No   Patient Choice   Provider Choice list and CMS website (https://medicare.gov/care-compare#search) for post-acute Quality and Resource Measure Data were provided and reviewed with: Patient   Patient / Family choosing to utilize agency / facility established prior to hospitalization Yes     11/08/2023 1100 Patient is POD #1 for Ventral Hernia and Parastomal Hernia Repair and NG remains in place. If therapy is needed at time of DC it can be provided at [facility] with outpatient prescriptions for PT/OT per patient that is her preference.

## 2023-11-09 ENCOUNTER — APPOINTMENT (OUTPATIENT)
Dept: RADIOLOGY | Facility: HOSPITAL | Age: 83
DRG: 354 | End: 2023-11-09
Payer: MEDICARE

## 2023-11-09 ENCOUNTER — HOSPITAL ENCOUNTER (OUTPATIENT)
Dept: CARDIOLOGY | Facility: HOSPITAL | Age: 83
Discharge: HOME | End: 2023-11-09
Payer: MEDICARE

## 2023-11-09 LAB
ANION GAP SERPL CALC-SCNC: 10 MMOL/L (ref 10–20)
BUN SERPL-MCNC: 9 MG/DL (ref 6–23)
CALCIUM SERPL-MCNC: 7.9 MG/DL (ref 8.6–10.3)
CARDIAC TROPONIN I PNL SERPL HS: 10 NG/L (ref 0–13)
CHLORIDE SERPL-SCNC: 103 MMOL/L (ref 98–107)
CO2 SERPL-SCNC: 26 MMOL/L (ref 21–32)
CREAT SERPL-MCNC: 0.5 MG/DL (ref 0.5–1.05)
GFR SERPL CREATININE-BSD FRML MDRD: >90 ML/MIN/1.73M*2
GLUCOSE SERPL-MCNC: 127 MG/DL (ref 74–99)
MAGNESIUM SERPL-MCNC: 1.7 MG/DL (ref 1.6–2.4)
POTASSIUM SERPL-SCNC: 4.2 MMOL/L (ref 3.5–5.3)
SODIUM SERPL-SCNC: 135 MMOL/L (ref 136–145)

## 2023-11-09 PROCEDURE — 99221 1ST HOSP IP/OBS SF/LOW 40: CPT | Performed by: FAMILY MEDICINE

## 2023-11-09 PROCEDURE — 80048 BASIC METABOLIC PNL TOTAL CA: CPT | Performed by: PHYSICIAN ASSISTANT

## 2023-11-09 PROCEDURE — 71045 X-RAY EXAM CHEST 1 VIEW: CPT | Performed by: STUDENT IN AN ORGANIZED HEALTH CARE EDUCATION/TRAINING PROGRAM

## 2023-11-09 PROCEDURE — 2500000004 HC RX 250 GENERAL PHARMACY W/ HCPCS (ALT 636 FOR OP/ED): Performed by: SURGERY

## 2023-11-09 PROCEDURE — 36415 COLL VENOUS BLD VENIPUNCTURE: CPT | Performed by: FAMILY MEDICINE

## 2023-11-09 PROCEDURE — C9113 INJ PANTOPRAZOLE SODIUM, VIA: HCPCS | Performed by: SURGERY

## 2023-11-09 PROCEDURE — 93010 ELECTROCARDIOGRAM REPORT: CPT | Performed by: INTERNAL MEDICINE

## 2023-11-09 PROCEDURE — 84484 ASSAY OF TROPONIN QUANT: CPT | Performed by: FAMILY MEDICINE

## 2023-11-09 PROCEDURE — 2500000001 HC RX 250 WO HCPCS SELF ADMINISTERED DRUGS (ALT 637 FOR MEDICARE OP): Performed by: INTERNAL MEDICINE

## 2023-11-09 PROCEDURE — 96372 THER/PROPH/DIAG INJ SC/IM: CPT | Performed by: PHYSICIAN ASSISTANT

## 2023-11-09 PROCEDURE — 2500000004 HC RX 250 GENERAL PHARMACY W/ HCPCS (ALT 636 FOR OP/ED): Performed by: PHYSICIAN ASSISTANT

## 2023-11-09 PROCEDURE — 94760 N-INVAS EAR/PLS OXIMETRY 1: CPT

## 2023-11-09 PROCEDURE — 36415 COLL VENOUS BLD VENIPUNCTURE: CPT | Performed by: PHYSICIAN ASSISTANT

## 2023-11-09 PROCEDURE — 83735 ASSAY OF MAGNESIUM: CPT | Performed by: PHYSICIAN ASSISTANT

## 2023-11-09 PROCEDURE — 2500000001 HC RX 250 WO HCPCS SELF ADMINISTERED DRUGS (ALT 637 FOR MEDICARE OP): Performed by: FAMILY MEDICINE

## 2023-11-09 PROCEDURE — 93005 ELECTROCARDIOGRAM TRACING: CPT

## 2023-11-09 PROCEDURE — 2500000005 HC RX 250 GENERAL PHARMACY W/O HCPCS: Performed by: PHYSICIAN ASSISTANT

## 2023-11-09 PROCEDURE — 71045 X-RAY EXAM CHEST 1 VIEW: CPT | Mod: FY

## 2023-11-09 PROCEDURE — 1100000001 HC PRIVATE ROOM DAILY

## 2023-11-09 RX ORDER — ACETAMINOPHEN 500 MG
1000 TABLET ORAL EVERY 8 HOURS PRN
Start: 2023-11-09

## 2023-11-09 RX ORDER — NAPROXEN SODIUM 220 MG/1
81 TABLET, FILM COATED ORAL DAILY
Status: DISCONTINUED | OUTPATIENT
Start: 2023-11-09 | End: 2023-11-14 | Stop reason: HOSPADM

## 2023-11-09 RX ADMIN — MORPHINE SULFATE 2 MG: 2 INJECTION, SOLUTION INTRAMUSCULAR; INTRAVENOUS at 20:05

## 2023-11-09 RX ADMIN — CEFAZOLIN SODIUM 1 G: 1 INJECTION, SOLUTION INTRAVENOUS at 05:24

## 2023-11-09 RX ADMIN — POTASSIUM CHLORIDE, DEXTROSE MONOHYDRATE AND SODIUM CHLORIDE 100 ML/HR: 150; 5; 450 INJECTION, SOLUTION INTRAVENOUS at 15:17

## 2023-11-09 RX ADMIN — CEFAZOLIN SODIUM 1 G: 1 INJECTION, SOLUTION INTRAVENOUS at 13:55

## 2023-11-09 RX ADMIN — POTASSIUM CHLORIDE, DEXTROSE MONOHYDRATE AND SODIUM CHLORIDE 100 ML/HR: 150; 5; 450 INJECTION, SOLUTION INTRAVENOUS at 05:14

## 2023-11-09 RX ADMIN — MORPHINE SULFATE 2 MG: 2 INJECTION, SOLUTION INTRAMUSCULAR; INTRAVENOUS at 05:13

## 2023-11-09 RX ADMIN — MORPHINE SULFATE 2 MG: 2 INJECTION, SOLUTION INTRAMUSCULAR; INTRAVENOUS at 10:03

## 2023-11-09 RX ADMIN — LOSARTAN POTASSIUM 50 MG: 50 TABLET, FILM COATED ORAL at 08:01

## 2023-11-09 RX ADMIN — Medication 3 MG: at 20:05

## 2023-11-09 RX ADMIN — MORPHINE SULFATE 2 MG: 2 INJECTION, SOLUTION INTRAMUSCULAR; INTRAVENOUS at 15:19

## 2023-11-09 RX ADMIN — MORPHINE SULFATE 2 MG: 2 INJECTION, SOLUTION INTRAMUSCULAR; INTRAVENOUS at 00:02

## 2023-11-09 RX ADMIN — LOSARTAN POTASSIUM 50 MG: 50 TABLET, FILM COATED ORAL at 20:05

## 2023-11-09 RX ADMIN — ASPIRIN 81 MG 81 MG: 81 TABLET ORAL at 16:27

## 2023-11-09 RX ADMIN — Medication 3 L/MIN: at 08:47

## 2023-11-09 RX ADMIN — ENOXAPARIN SODIUM 40 MG: 40 INJECTION SUBCUTANEOUS at 08:01

## 2023-11-09 RX ADMIN — PANTOPRAZOLE SODIUM 40 MG: 40 INJECTION, POWDER, FOR SOLUTION INTRAVENOUS at 07:58

## 2023-11-09 ASSESSMENT — COGNITIVE AND FUNCTIONAL STATUS - GENERAL
DAILY ACTIVITIY SCORE: 21
DRESSING REGULAR LOWER BODY CLOTHING: A LITTLE
DAILY ACTIVITIY SCORE: 21
HELP NEEDED FOR BATHING: A LITTLE
DRESSING REGULAR LOWER BODY CLOTHING: A LITTLE
MOVING TO AND FROM BED TO CHAIR: A LITTLE
TOILETING: A LITTLE
CLIMB 3 TO 5 STEPS WITH RAILING: A LITTLE
CLIMB 3 TO 5 STEPS WITH RAILING: A LITTLE
TOILETING: A LITTLE
STANDING UP FROM CHAIR USING ARMS: A LITTLE
MOVING TO AND FROM BED TO CHAIR: A LITTLE
WALKING IN HOSPITAL ROOM: A LITTLE
HELP NEEDED FOR BATHING: A LITTLE
MOBILITY SCORE: 20
MOBILITY SCORE: 20
STANDING UP FROM CHAIR USING ARMS: A LITTLE
WALKING IN HOSPITAL ROOM: A LITTLE

## 2023-11-09 ASSESSMENT — PAIN SCALES - GENERAL
PAINLEVEL_OUTOF10: 3
PAINLEVEL_OUTOF10: 6
PAINLEVEL_OUTOF10: 6
PAINLEVEL_OUTOF10: 5 - MODERATE PAIN
PAINLEVEL_OUTOF10: 4
PAINLEVEL_OUTOF10: 4
PAINLEVEL_OUTOF10: 6

## 2023-11-09 ASSESSMENT — PAIN DESCRIPTION - LOCATION: LOCATION: ABDOMEN

## 2023-11-09 ASSESSMENT — PAIN - FUNCTIONAL ASSESSMENT
PAIN_FUNCTIONAL_ASSESSMENT: 0-10

## 2023-11-09 NOTE — PROGRESS NOTES
Sunita Luther is a 83 y.o. female s/p Ventral Hernia and Parastomal Hernia Repair (POD#2)     Subjective   Patient states that she slept well; not having a lot of pain now, had some muscle spasms yesterday.  She sat up in a chair for 1.5 hours yesterday.  Discussed the importance of ambulating some today.     Objective   NGT: 350 ml. (24 hrs)  TAMMY Drain: 70 ml.   UOP: 600 ml.    Physical Exam  Gen: NAD  Eyes:  EOM intact  ENT: MMM  Neck: No JVD  Respiratory: CTAB, no wheezes/rhonchi  Cardiac: RRR, no murmurs rubs or gallops  Abdomen: soft, NT, +BS.  Incisions are intact, dry and healing well.  Stoma is pink and appears healthy.  No output  yet.  Extremities: no edema or cyanosis  Neuro: No focal deficits, alert and oriented x 3  Psych:  appropriate mood and behavior    Last Recorded Vitals  Blood pressure 136/65, pulse 83, temperature 36 °C (96.8 °F), resp. rate 18, weight 81 kg (178 lb 9.2 oz), SpO2 92 %.    Intake/Output last 3 Shifts:  I/O last 3 completed shifts:  In: 2893.3 (35.7 mL/kg) [P.O.:15; I.V.:1725 (21.3 mL/kg); IV Piggyback:1153.3]  Out: 2735 (33.8 mL/kg) [Urine:2200 (0.8 mL/kg/hr); Emesis/NG output:350; Drains:185]  Weight: 81 kg     Results for orders placed or performed during the hospital encounter of 11/07/23 (from the past 24 hour(s))   Basic metabolic panel   Result Value Ref Range    Glucose 127 (H) 74 - 99 mg/dL    Sodium 135 (L) 136 - 145 mmol/L    Potassium 4.2 3.5 - 5.3 mmol/L    Chloride 103 98 - 107 mmol/L    Bicarbonate 26 21 - 32 mmol/L    Anion Gap 10 10 - 20 mmol/L    Urea Nitrogen 9 6 - 23 mg/dL    Creatinine 0.50 0.50 - 1.05 mg/dL    eGFR >90 >60 mL/min/1.73m*2    Calcium 7.9 (L) 8.6 - 10.3 mg/dL   Magnesium   Result Value Ref Range    Magnesium 1.70 1.60 - 2.40 mg/dL      Assessment:  - Ventral Hernia and Parastomal Hernia Repair (POD#2)     Recommendations:  - Maintain NGT until flatus  - NPO except for meds  - Ambulate the patient 5 times a day as tolerated  - Clean around TAMMY drain  tube site with alcohol wipe daily  - Continue lovenox   - Maintain rothman until she is more mobile/ambulating/frequent IS  - Hospitalist service for medical management  - IV morphine as needed  - labs every other day if needed, no unnecessary labs please        I spent 30 minutes in the professional and overall care of this patient.      JACOB JimenezC

## 2023-11-09 NOTE — CONSULTS
Consults    Reason For Consult  Medical management    History Of Present Illness  Sunita Luther is a 83 y.o. female with a history of stress-induced cardiomyopathy, diverticulosis, hypertension hyperlipidemia is status post ventral hernia repair POD 2.  Patient was cleared by cardiology to proceed with the surgery.  During the procedure only 20 cc of blood loss was reported and there were no reported complications.  Since returning to the floor the patient has maintained the NG tube and been n.p.o. except for medications.    On my evaluation the patient resumed complaining of a substernal chest pressure that she is associating with being tired and reports it is relieved by coughing.     Past Medical History  She has a past medical history of Cardiomyopathy (CMS/HCC), Class 1 obesity with body mass index (BMI) of 32.0 to 32.9 in adult, Colovaginal fistula, Diverticulosis, Encounter for pre-operative cardiovascular clearance (10/23/2023), Lumbar disc displacement without myelopathy, Lumbar radiculitis, Lumbar spondylosis, Osteoarthritis of right hip, Osteoporosis, Parastomal hernia without obstruction or gangrene, Paroxysmal atrial fibrillation (CMS/HCC), Personal history of other diseases of the circulatory system (07/18/2014), and Postmenopausal bleeding.    Surgical History  She has a past surgical history that includes Hemorrhoid surgery (02/24/2014); Varicose vein surgery (02/24/2014); Breast lumpectomy (02/24/2014); Hysterectomy (10/27/2015); and Other surgical history (12/18/2015).     Social History  She reports that she has never smoked. She has never been exposed to tobacco smoke. She has never used smokeless tobacco. She reports that she does not drink alcohol and does not use drugs.    Family History  Family History   Problem Relation Name Age of Onset    Hypertension Mother      Heart attack Mother      Stroke Father      Lung cancer Father          Allergies  Hydrocodone-acetaminophen, Oxycodone, and  Bactrim [sulfamethoxazole-trimethoprim]       Physical Exam  Gen: lying comfortably in bed, not in acute distress  HEENT: atraumatic, normocephalic  Pulm: normal respiratory effort, clear to auscultation b/l  Cardiac: RRR, no murmurs noted, normal S1/S2  GI: Soft, nontender, BS+  MSK: normal ROM without joint swelling  Extremities: no LE edema, cyanosis  Neuro: AOX3, CN II-XII grossly intact, equal b/l strength, no loss in sensation   Psych: calm and appropriate for situation        Last Recorded Vitals  /65   Pulse 83   Temp 36 °C (96.8 °F)   Resp 18   Wt 81 kg (178 lb 9.2 oz)   SpO2 92%        Assessment/Plan     Sunita Luther is a 83 y.o. female with a history of stress-induced cardiomyopathy, diverticulosis, hypertension hyperlipidemia is status post ventral hernia repair    Status post ventral hernia and parastomal hernia repair  Continue to follow with primary team  To maintain NGT until flatus  N.p.o. except for meds  Advised to ambulate 5 times a day  Continue morphine as needed    Chest pain  We will follow-up with EKG, chest x-ray and troponin    Hypertension  Continue losartan  Blood pressure improving, will continue to monitor    Hyperlipidemia  Atorvastatin can be restarted when patient is able to tolerate oral intake    DVT ppx  Lovenox        Natanael White DO

## 2023-11-09 NOTE — CARE PLAN
Problem: Pain  Goal: Turns in bed with improved pain control throughout the shift  Outcome: Progressing     Problem: Fall/Injury  Goal: Not fall by end of shift  Outcome: Progressing     Problem: Pain  Goal: My pain/discomfort is manageable  Outcome: Progressing     Problem: Safety  Goal: Patient will be injury free during hospitalization  Outcome: Progressing     Problem: Indwelling Catheter Maintenance  Goal: I will have no complications from indwelling catheter  Outcome: Progressing     Problem: Skin  Goal: Prevent/minimize sheer/friction injuries  Outcome: Progressing   The patient's goals for the shift include      The clinical goals for the shift include Pain control and ambulation

## 2023-11-09 NOTE — DISCHARGE INSTRUCTIONS
Up and down the stairs is fine.    Walk around intermittently throughout the day, stay active.  Do not lift anything heavier than 10 lbs. For 6 weeks.    Wear your abdominal binder for comfort, may remove at bedtime.  No lotions or creams   Contact Dr. Guzman office to schedule a follow up visit in 1 week for drain removal, post op check

## 2023-11-10 PROBLEM — K43.2 VENTRAL INCISIONAL HERNIA: Status: RESOLVED | Noted: 2023-10-30 | Resolved: 2023-11-10

## 2023-11-10 PROCEDURE — C9113 INJ PANTOPRAZOLE SODIUM, VIA: HCPCS | Performed by: SURGERY

## 2023-11-10 PROCEDURE — 99232 SBSQ HOSP IP/OBS MODERATE 35: CPT | Performed by: FAMILY MEDICINE

## 2023-11-10 PROCEDURE — 2500000004 HC RX 250 GENERAL PHARMACY W/ HCPCS (ALT 636 FOR OP/ED): Performed by: SURGERY

## 2023-11-10 PROCEDURE — 96372 THER/PROPH/DIAG INJ SC/IM: CPT | Performed by: PHYSICIAN ASSISTANT

## 2023-11-10 PROCEDURE — 2500000001 HC RX 250 WO HCPCS SELF ADMINISTERED DRUGS (ALT 637 FOR MEDICARE OP): Performed by: INTERNAL MEDICINE

## 2023-11-10 PROCEDURE — 94760 N-INVAS EAR/PLS OXIMETRY 1: CPT

## 2023-11-10 PROCEDURE — 2500000001 HC RX 250 WO HCPCS SELF ADMINISTERED DRUGS (ALT 637 FOR MEDICARE OP): Performed by: FAMILY MEDICINE

## 2023-11-10 PROCEDURE — 1100000001 HC PRIVATE ROOM DAILY

## 2023-11-10 PROCEDURE — 2500000004 HC RX 250 GENERAL PHARMACY W/ HCPCS (ALT 636 FOR OP/ED): Performed by: PHYSICIAN ASSISTANT

## 2023-11-10 PROCEDURE — 97165 OT EVAL LOW COMPLEX 30 MIN: CPT | Mod: GO

## 2023-11-10 PROCEDURE — 99232 SBSQ HOSP IP/OBS MODERATE 35: CPT | Performed by: SURGERY

## 2023-11-10 RX ORDER — FUROSEMIDE 20 MG/1
20 TABLET ORAL DAILY
Status: CANCELLED | OUTPATIENT
Start: 2023-11-10

## 2023-11-10 RX ORDER — PROCHLORPERAZINE EDISYLATE 5 MG/ML
5 INJECTION INTRAMUSCULAR; INTRAVENOUS EVERY 4 HOURS PRN
Status: DISCONTINUED | OUTPATIENT
Start: 2023-11-10 | End: 2023-11-14 | Stop reason: HOSPADM

## 2023-11-10 RX ORDER — MAGNESIUM SULFATE HEPTAHYDRATE 40 MG/ML
2 INJECTION, SOLUTION INTRAVENOUS ONCE
Status: COMPLETED | OUTPATIENT
Start: 2023-11-10 | End: 2023-11-10

## 2023-11-10 RX ORDER — ONDANSETRON HYDROCHLORIDE 2 MG/ML
4 INJECTION, SOLUTION INTRAVENOUS EVERY 6 HOURS
Status: DISCONTINUED | OUTPATIENT
Start: 2023-11-10 | End: 2023-11-14 | Stop reason: HOSPADM

## 2023-11-10 RX ORDER — HYDRALAZINE HYDROCHLORIDE 20 MG/ML
10 INJECTION INTRAMUSCULAR; INTRAVENOUS EVERY 4 HOURS PRN
Status: DISCONTINUED | OUTPATIENT
Start: 2023-11-10 | End: 2023-11-14 | Stop reason: HOSPADM

## 2023-11-10 RX ADMIN — Medication 3 MG: at 20:54

## 2023-11-10 RX ADMIN — ASPIRIN 81 MG 81 MG: 81 TABLET ORAL at 08:18

## 2023-11-10 RX ADMIN — POTASSIUM CHLORIDE, DEXTROSE MONOHYDRATE AND SODIUM CHLORIDE 100 ML/HR: 150; 5; 450 INJECTION, SOLUTION INTRAVENOUS at 10:46

## 2023-11-10 RX ADMIN — POTASSIUM CHLORIDE, DEXTROSE MONOHYDRATE AND SODIUM CHLORIDE 100 ML/HR: 150; 5; 450 INJECTION, SOLUTION INTRAVENOUS at 21:09

## 2023-11-10 RX ADMIN — MORPHINE SULFATE 2 MG: 2 INJECTION, SOLUTION INTRAMUSCULAR; INTRAVENOUS at 20:54

## 2023-11-10 RX ADMIN — POTASSIUM CHLORIDE, DEXTROSE MONOHYDRATE AND SODIUM CHLORIDE 100 ML/HR: 150; 5; 450 INJECTION, SOLUTION INTRAVENOUS at 00:11

## 2023-11-10 RX ADMIN — MORPHINE SULFATE 2 MG: 2 INJECTION, SOLUTION INTRAMUSCULAR; INTRAVENOUS at 00:16

## 2023-11-10 RX ADMIN — MORPHINE SULFATE 2 MG: 2 INJECTION, SOLUTION INTRAMUSCULAR; INTRAVENOUS at 08:18

## 2023-11-10 RX ADMIN — ONDANSETRON 4 MG: 2 INJECTION INTRAMUSCULAR; INTRAVENOUS at 22:32

## 2023-11-10 RX ADMIN — PANTOPRAZOLE SODIUM 40 MG: 40 INJECTION, POWDER, FOR SOLUTION INTRAVENOUS at 06:03

## 2023-11-10 RX ADMIN — LOSARTAN POTASSIUM 50 MG: 50 TABLET, FILM COATED ORAL at 08:18

## 2023-11-10 RX ADMIN — MORPHINE SULFATE 2 MG: 2 INJECTION, SOLUTION INTRAMUSCULAR; INTRAVENOUS at 15:20

## 2023-11-10 RX ADMIN — LOSARTAN POTASSIUM 50 MG: 50 TABLET, FILM COATED ORAL at 20:54

## 2023-11-10 RX ADMIN — HYDRALAZINE HYDROCHLORIDE 10 MG: 20 INJECTION INTRAMUSCULAR; INTRAVENOUS at 11:03

## 2023-11-10 RX ADMIN — ENOXAPARIN SODIUM 40 MG: 40 INJECTION SUBCUTANEOUS at 08:18

## 2023-11-10 RX ADMIN — MAGNESIUM SULFATE HEPTAHYDRATE 2 G: 40 INJECTION, SOLUTION INTRAVENOUS at 10:54

## 2023-11-10 ASSESSMENT — COGNITIVE AND FUNCTIONAL STATUS - GENERAL
HELP NEEDED FOR BATHING: A LOT
DRESSING REGULAR LOWER BODY CLOTHING: A LITTLE
TOILETING: A LITTLE
TOILETING: A LITTLE
DRESSING REGULAR LOWER BODY CLOTHING: A LOT
HELP NEEDED FOR BATHING: A LITTLE
CLIMB 3 TO 5 STEPS WITH RAILING: A LITTLE
DRESSING REGULAR LOWER BODY CLOTHING: A LOT
MOBILITY SCORE: 20
STANDING UP FROM CHAIR USING ARMS: A LITTLE
MOVING TO AND FROM BED TO CHAIR: A LITTLE
STANDING UP FROM CHAIR USING ARMS: A LITTLE
HELP NEEDED FOR BATHING: A LOT
WALKING IN HOSPITAL ROOM: A LITTLE
CLIMB 3 TO 5 STEPS WITH RAILING: A LITTLE
TOILETING: A LITTLE
DAILY ACTIVITIY SCORE: 19
DAILY ACTIVITIY SCORE: 21
MOVING TO AND FROM BED TO CHAIR: A LITTLE
MOBILITY SCORE: 20
WALKING IN HOSPITAL ROOM: A LITTLE
DAILY ACTIVITIY SCORE: 19

## 2023-11-10 ASSESSMENT — ACTIVITIES OF DAILY LIVING (ADL): ADL_ASSISTANCE: INDEPENDENT

## 2023-11-10 ASSESSMENT — PAIN SCALES - GENERAL
PAINLEVEL_OUTOF10: 5 - MODERATE PAIN
PAINLEVEL_OUTOF10: 4
PAINLEVEL_OUTOF10: 6
PAINLEVEL_OUTOF10: 0 - NO PAIN

## 2023-11-10 ASSESSMENT — PAIN - FUNCTIONAL ASSESSMENT
PAIN_FUNCTIONAL_ASSESSMENT: 0-10

## 2023-11-10 NOTE — CARE PLAN
The patient's goals for the shift include getting up and walking    The clinical goals for the shift include Patients pain will be controlled throughout nshift

## 2023-11-10 NOTE — PROGRESS NOTES
"Addendum: I have personally reviewed the below progress note and interviewed the patient. Agree with below except:    Patient has minimal NG output and a little nausea. No flatus or colostomy output yet. NG tube removed by me. Will start ice chips and popsicles for comfort. Add phenergan for nausea and made zofran scheduled. Replaced magnesium. Encourage ambulation, working with PT.     Hernan Silva MD  Bariatric and Minimally Invasive General Surgery      Sunita Luther is a 83 y.o. female s/p Ventral Hernia and Parastomal Hernia Repair (POD#3)      Subjective   Abdominal binder is too tight; RN adjusted it and this seems to be more comfortable for the patient.  Patient able to ambulate independently (>100') without an assistive device prior to her surgery.  She is willing to ambulate today with assistance.  No flatus out yet.  CXR shows bibasilar airspace opacities, more pronounced in the left lung compared to the right.  Needs to ambulate.  Discussed removing rothman catheter and the fact that she will need to get up to use the BR, she agrees.      Objective   UOP: 900 ml.  Colostomy: 0  Drain: 25 ml.     Physical Exam  Gen: NAD  Eyes:  EOM intact  ENT: MMM  Neck: No JVD  Respiratory: unlabored  Cardiac: RRR,   Abdomen: soft, appropriate surgical tenderness.  Extremities: no edema or cyanosis  Neuro: No focal deficits, alert and oriented x 3  Psych:  appropriate mood and behavior    Last Recorded Vitals  Blood pressure (Abnormal) 184/74, pulse 76, temperature 36.3 °C (97.3 °F), temperature source Temporal, resp. rate 18, height 1.575 m (5' 2.01\"), weight 81 kg (178 lb 9.2 oz), SpO2 95 %.    Intake/Output last 3 Shifts:  I/O last 3 completed shifts:  In: 3776.7 (46.6 mL/kg) [P.O.:15; I.V.:70 (0.9 mL/kg); IV Piggyback:3691.7]  Out: 2683 (33.1 mL/kg) [Urine:2200 (0.8 mL/kg/hr); Emesis/NG output:350; Drains:133]  Weight: 81 kg     Relevant Results  Results for orders placed or performed during the hospital encounter of " 11/07/23 (from the past 24 hour(s))   Troponin I, High Sensitivity   Result Value Ref Range    Troponin I, High Sensitivity 10 0 - 13 ng/L     XR chest 1 view    Result Date: 11/9/2023  Interpreted By:  Leonarda Amador, STUDY: XR CHEST 1 VIEW;   INDICATION: Signs/Symptoms:chest pain.   COMPARISON: 07/01/2018 and abdomen radiograph dated 11/07/2023   ACCESSION NUMBER(S): [id number]   ORDERING CLINICIAN: SEBASTIAN YUSUF   FINDINGS: There is elevation of the right hemidiaphragm, slightly increased on today's radiograph compared to prior radiograph, however is similar compared to immediate prior abdomen radiograph dated 11/07/2023. Cardiac silhouette is normal in size. Bilateral lungs are hypoinflated. There is ill-defined bandlike airspace opacities in bilateral lung bases, more pronounced in the left lung base compared to the right which could represent atelectasis versus airspace infiltrate. Bilateral lungs demonstrate prominence of coarse interstitial markings. Dense atherosclerotic calcifications of the aortic knob are noted. Nasogastric tube is noted extending through mediastinum and into the abdomen with its tip not included in the field of view. No large pneumothorax. Obliteration of the left costophrenic angle could be related to positioning, however small left pleural effusion can not be excluded. Acute osseous findings.       1. Bibasilar airspace opacities, more pronounced in the left lung compared to the right could represent atelectasis, however airspace infiltrate secondary to atypical infectious etiology can also be considered in the differential in appropriate clinical setting. 2. Mild prominence of interstitial markings in bilateral lungs could be related to combination of bronchovascular crowding secondary to hypoinflated lungs versus mild interstitial edema. Recommend correlation with fluid status. 3. Elevation of the right diaphragmatic dome as described above.     Signed by: Leonarda Amador  11/9/2023 4:22 PM Dictation workstation:   [id number]    Assessment:  - Ventral Hernia and Parastomal Hernia Repair (POD#3)  - Bibasilar airspace opacities left greater than the right   - Atelectasis      Recommendations:  - remove rothman  - PT/OT  - Maintain NGT until flatus  - NPO except for meds  - Ambulate the patient 5 times a day as tolerated/Frequent IS  - Clean around TAMMY drain tube site with alcohol wipe daily  - Continue lovenox   - Medical management per Dr. White  - IV morphine as needed        I spent 30 minutes in the professional and overall care of this patient.      Reena Gomes PA-C

## 2023-11-10 NOTE — CARE PLAN
The patient's goals for the shift include      The clinical goals for the shift include Patients pain will be controlled throughout nshift    Over the shift, the patient did not make progress toward the following goals. Barriers to progression include   Problem: Pain  Goal: Takes deep breaths with improved pain control throughout the shift  Outcome: Progressing  Goal: Turns in bed with improved pain control throughout the shift  Outcome: Progressing  Goal: Walks with improved pain control throughout the shift  Outcome: Progressing  Goal: Performs ADL's with improved pain control throughout shift  Outcome: Progressing  Goal: Participates in PT with improved pain control throughout the shift  Outcome: Progressing  Goal: Free from opioid side effects throughout the shift  Outcome: Progressing  Goal: Free from acute confusion related to pain meds throughout the shift  Outcome: Progressing     Problem: Skin  Goal: Decreased wound size/increased tissue granulation at next dressing change  Outcome: Progressing  Goal: Participates in plan/prevention/treatment measures  Outcome: Progressing  Goal: Prevent/manage excess moisture  Outcome: Progressing  Goal: Prevent/minimize sheer/friction injuries  Outcome: Progressing  Goal: Promote/optimize nutrition  Outcome: Progressing  Goal: Promote skin healing  Outcome: Progressing   . Recommendations to address these barriers include .

## 2023-11-10 NOTE — PROGRESS NOTES
11/10/23 1434   Discharge Planning   Living Arrangements Other (Comment)  (from [facility])   Support Systems [employer]   Assistance Needed A&Ox3, uses a rollator, room air at baseline   Type of Residence Assisted living   Care Facility Name [facility] or Post Acute Services Other (Comment)  (need script for outpatient PT/OT for [facility])   Patient expects to be discharged to: return to [facility] with outpatient PT/OT scripts

## 2023-11-10 NOTE — PROGRESS NOTES
"Sunita Luther is a 83 y.o. female on day 3 of admission presenting with Ventral incisional hernia.    Subjective   Patient was evaluated at bedside  States that her chest pain has improved from yesterday  Denies any SOB     Objective     Physical Exam  Vitals and nursing note reviewed.   Constitutional:       General: She is not in acute distress.     Appearance: Normal appearance. She is not ill-appearing, toxic-appearing or diaphoretic.   HENT:      Head: Normocephalic and atraumatic.      Nose:      Comments: NG tube present  Cardiovascular:      Rate and Rhythm: Normal rate and regular rhythm.      Pulses: Normal pulses.      Heart sounds: Normal heart sounds. No murmur heard.     No friction rub. No gallop.   Pulmonary:      Effort: Pulmonary effort is normal. No respiratory distress.      Breath sounds: Normal breath sounds. No stridor. No wheezing, rhonchi or rales.   Chest:      Chest wall: No tenderness.   Skin:     General: Skin is warm and dry.   Neurological:      General: No focal deficit present.      Mental Status: She is alert and oriented to person, place, and time. Mental status is at baseline.   Psychiatric:         Mood and Affect: Mood normal.         Behavior: Behavior normal.         Thought Content: Thought content normal.         Judgment: Judgment normal.           Last Recorded Vitals  Blood pressure (!) 184/74, pulse 76, temperature 36.3 °C (97.3 °F), temperature source Temporal, resp. rate 18, height 1.575 m (5' 2.01\"), weight 81 kg (178 lb 9.2 oz), SpO2 95 %.  Intake/Output last 3 Shifts:  I/O last 3 completed shifts:  In: 3776.7 (46.6 mL/kg) [P.O.:15; I.V.:70 (0.9 mL/kg); IV Piggyback:3691.7]  Out: 2683 (33.1 mL/kg) [Urine:2200 (0.8 mL/kg/hr); Emesis/NG output:350; Drains:133]  Weight: 81 kg     Relevant Results                Assessment/Plan   Active Problems:  There are no active Hospital Problems.    S/p ventral hernia and parastomal hernia repiar  - NGT, NPO except for " meds  -morphine PRN  -encourage OOBA (5x daily), PT/OT following     Chest pain (resolved)  -secondary to MSK pain following surgery  -EKG negative for ACS, CXR negative for acute intracranial process, troponins within normal limits    Atelectasis  -Left lung more pronounced than right  -Encourage IS  -On 2L NC, No home oxygen requirement  -Wean as tolerated    HTN  -Losartan    GERD  -protonix    HLD  -Atorvastatin    DVTppx: Lovenox           Keiry Carranza DO      Patient seen, examined and discussed with the resident I agree with the findings and plan above

## 2023-11-10 NOTE — PROGRESS NOTES
Occupational Therapy    Evaluation    Patient Name: Sunita Luther  MRN: [id number]  Today's Date: 11/10/2023  Time Calculation  Start Time: 1132  Stop Time: 1148  Time Calculation (min): 16 min    Assessment  IP OT Assessment  OT Assessment: Pt is a 82 y/o woman who is POD #3 from ventral hernia and parastomal hernia repair. Pt typically is independent without any AD during the day and completes ADL's independently. Pt presents with pain, nausea, decreased balance, and endurance. Pt to benefit from skilled OT services to increase independence with ADL's, transfers, and mobility.  Prognosis: Excellent  Barriers to Discharge: None  Evaluation/Treatment Tolerance: Patient tolerated treatment well  Medical Staff Made Aware: Yes  End of Session Communication: Bedside nurse  End of Session Patient Position: Up in chair, Alarm on  Plan:  Treatment Interventions: ADL retraining, Endurance training, Functional transfer training  OT Frequency: 2 times per week  OT Discharge Recommendations: Low intensity level of continued care  Equipment Recommended upon Discharge: Wheeled walker  OT Recommended Transfer Status: Stand by assist  OT - OK to Discharge: Yes    Subjective   Current Problem:  1. Ventral incisional hernia        2. Ventral hernia without obstruction or gangrene        3. Parastomal hernia without obstruction or gangrene        4. S/P repair of ventral hernia  acetaminophen (Tylenol) 500 mg tablet        General:  General  Reason for Referral: Pt is a 82 y/o woman who is POD #3 from a ventral hernia and parastomal hernia repair  Past Medical History Relevant to Rehab: Past medical history of Cardiomyopathy (CMS/HCC), Colovaginal fistula, Diverticulosis, Lumbar disc displacement without myelopathy, Lumbar radiculitis, Lumbar spondylosis, Osteoarthritis of right hip, Osteoporosis, Parastomal hernia without obstruction or gangrene, Personal history of other diseases of the circulatory system (07/18/2014), and  Postmenopausal bleeding.  Family/Caregiver Present: No  Prior to Session Communication: Bedside nurse  Patient Position Received: Up in chair, Alarm on  General Comment: Pt agreeable to OT eval. Pt reports some pain and nausea. Pt was able to ambulate in the room with FWW at [facility].  Precautions:  Medical Precautions: Fall precautions (IV, 2L of oxygen, NG tube, TAMMY drain, abdominal binder, alarms)  Post-Surgical Precautions: Abdominal surgery precautions     Pain:  Pain Assessment  Pain Assessment: 0-10  Pain Score: 4  Pain Type: Acute pain, Surgical pain  Pain Location: Abdomen  Pain Interventions: Repositioned    Objective   Cognition:  Overall Cognitive Status: Within Functional Limits  Orientation Level: Oriented X4     Home Living:  Type of Home: Other (Comment) (From [facility])  Lives With: Alone  Home Adaptive Equipment: Walker rolling or standard  Home Layout: Other (Comment) (elevator to floor)  Home Access: No concerns  Bathroom Shower/Tub: Walk-in shower  Bathroom Equipment: Grab bars in shower, Shower chair with back, Grab bars around toilet   Prior Function:  Level of [place]: Independent with ADLs and functional transfers  Receives Help From: Other (Comment) (Staff)  ADL Assistance: Independent  Homemaking Assistance: Needs assistance  Ambulatory Assistance: Independent  Prior Function Comments: Pt only uses a FWW at night but no AD during the day. Pt is independent with ADL's. Facility assists with IADL's. Pt reports doing restorative therapy x4 days a week with PT and OT.     ADL:  ADL Comments: Anticipate CGA-min A for LB ADL's d/t pain and abdominal precautions  Activity Tolerance:  Endurance: Tolerates less than 10 min exercise, no significant change in vital signs  Bed Mobility/Transfers: Transfers  Transfer: Yes  Transfer 1  Technique 1: Sit to stand  Transfer Device 1: Walker  Transfer Level of Assistance 1: Minimum assistance  Transfers 2  Technique 2: Stand to sit  Transfer Device 2:  Walker  Transfer Level of Assistance 2: Contact guard    Ambulation/Gait Training:  Ambulation/Gait Training  Ambulation/Gait Training Performed: Yes  Ambulation/Gait Training 1  Comments/Distance (ft) 1: Pt ambulated within the room with FWW at [facility]  Sitting Balance:  Static Sitting Balance  Static Sitting-Balance Support: No upper extremity supported, Feet supported  Static Sitting-Level of Assistance: Independent  Standing Balance:  Static Standing Balance  Static Standing-Balance Support: Bilateral upper extremity supported  Static Standing-Level of Assistance: Close supervision  Dynamic Standing Balance  Dynamic Standing-Balance Support: Bilateral upper extremity supported  Dynamic Standing-Comments: [facility] with FWW    Strength:  Strength Comments: B UE's: grossly 3+/5    Extremities: RUE   RUE : Within Functional Limits and LUE   LUE: Within Functional Limits    Outcome Measures: [facility] Daily Activity  Putting on and taking off regular lower body clothing: A lot  Bathing (including washing, rinsing, drying): A lot  Putting on and taking off regular upper body clothing: None  Toileting, which includes using toilet, bedpan or urinal: A little  Taking care of personal grooming such as brushing teeth: None  Eating Meals: None  Daily Activity - Total Score: 19      Education Documentation  ADL Training, taught by Ritika Alexander OT at 11/10/2023 12:20 PM.  Learner: Patient  Readiness: Acceptance  Method: Explanation  Response: Verbalizes Understanding    Education Comments  No comments found.      Goals:   Encounter Problems       Encounter Problems (Active)       Balance       Pt will demo good static/dynamic standing balance during ADL and functional activity with FWW >8 minutes for improved independence and participation in ADL        Start:  11/10/23    Expected End:  11/24/23               Dressings Lower Extremities       Patient to complete lower body dressing with AE as needed at mod I        Start:   11/10/23    Expected End:  11/24/23               Endurance       Pt will increase endurance to tolerate 15 min of activity with no more than 1 rest break in order to increase ability to engage in ADL completion.        Start:  11/10/23    Expected End:  11/24/23               Mobility       Pt will demo increased functional mobility to tolerate tasks necessary to complete ADL routine with FWW at mod I        Start:  11/10/23    Expected End:  11/24/23               Toileting       Patient will complete toileting tasks with FWW at [facility]        Start:  11/10/23    Expected End:  11/24/23               Transfers       Pt to demonstrate transfers with FWW at [facility]        Start:  11/10/23    Expected End:  11/24/23

## 2023-11-11 LAB
ANION GAP SERPL CALC-SCNC: 12 MMOL/L (ref 10–20)
BASOPHILS # BLD AUTO: 0.05 X10*3/UL (ref 0–0.1)
BASOPHILS NFR BLD AUTO: 0.6 %
BUN SERPL-MCNC: 6 MG/DL (ref 6–23)
CALCIUM SERPL-MCNC: 8.2 MG/DL (ref 8.6–10.3)
CHLORIDE SERPL-SCNC: 106 MMOL/L (ref 98–107)
CO2 SERPL-SCNC: 26 MMOL/L (ref 21–32)
CREAT SERPL-MCNC: 0.4 MG/DL (ref 0.5–1.05)
EOSINOPHIL # BLD AUTO: 0.52 X10*3/UL (ref 0–0.4)
EOSINOPHIL NFR BLD AUTO: 5.8 %
ERYTHROCYTE [DISTWIDTH] IN BLOOD BY AUTOMATED COUNT: 13 % (ref 11.5–14.5)
GFR SERPL CREATININE-BSD FRML MDRD: >90 ML/MIN/1.73M*2
GLUCOSE SERPL-MCNC: 117 MG/DL (ref 74–99)
HCT VFR BLD AUTO: 35.3 % (ref 36–46)
HGB BLD-MCNC: 11.2 G/DL (ref 12–16)
IMM GRANULOCYTES # BLD AUTO: 0.05 X10*3/UL (ref 0–0.5)
IMM GRANULOCYTES NFR BLD AUTO: 0.6 % (ref 0–0.9)
LYMPHOCYTES # BLD AUTO: 0.92 X10*3/UL (ref 0.8–3)
LYMPHOCYTES NFR BLD AUTO: 10.2 %
MAGNESIUM SERPL-MCNC: 1.86 MG/DL (ref 1.6–2.4)
MCH RBC QN AUTO: 29.8 PG (ref 26–34)
MCHC RBC AUTO-ENTMCNC: 31.7 G/DL (ref 32–36)
MCV RBC AUTO: 94 FL (ref 80–100)
MONOCYTES # BLD AUTO: 0.83 X10*3/UL (ref 0.05–0.8)
MONOCYTES NFR BLD AUTO: 9.2 %
NEUTROPHILS # BLD AUTO: 6.64 X10*3/UL (ref 1.6–5.5)
NEUTROPHILS NFR BLD AUTO: 73.6 %
NRBC BLD-RTO: 0 /100 WBCS (ref 0–0)
PLATELET # BLD AUTO: 240 X10*3/UL (ref 150–450)
POTASSIUM SERPL-SCNC: 4 MMOL/L (ref 3.5–5.3)
RBC # BLD AUTO: 3.76 X10*6/UL (ref 4–5.2)
SODIUM SERPL-SCNC: 140 MMOL/L (ref 136–145)
WBC # BLD AUTO: 9 X10*3/UL (ref 4.4–11.3)

## 2023-11-11 PROCEDURE — 2500000001 HC RX 250 WO HCPCS SELF ADMINISTERED DRUGS (ALT 637 FOR MEDICARE OP): Performed by: FAMILY MEDICINE

## 2023-11-11 PROCEDURE — 36415 COLL VENOUS BLD VENIPUNCTURE: CPT | Performed by: SURGERY

## 2023-11-11 PROCEDURE — 2500000004 HC RX 250 GENERAL PHARMACY W/ HCPCS (ALT 636 FOR OP/ED): Performed by: PHYSICIAN ASSISTANT

## 2023-11-11 PROCEDURE — 2500000004 HC RX 250 GENERAL PHARMACY W/ HCPCS (ALT 636 FOR OP/ED): Performed by: SURGERY

## 2023-11-11 PROCEDURE — 97530 THERAPEUTIC ACTIVITIES: CPT | Mod: GP

## 2023-11-11 PROCEDURE — 2500000005 HC RX 250 GENERAL PHARMACY W/O HCPCS: Performed by: PHYSICIAN ASSISTANT

## 2023-11-11 PROCEDURE — 99232 SBSQ HOSP IP/OBS MODERATE 35: CPT | Performed by: SURGERY

## 2023-11-11 PROCEDURE — 83735 ASSAY OF MAGNESIUM: CPT | Performed by: SURGERY

## 2023-11-11 PROCEDURE — 1100000001 HC PRIVATE ROOM DAILY

## 2023-11-11 PROCEDURE — C9113 INJ PANTOPRAZOLE SODIUM, VIA: HCPCS | Performed by: SURGERY

## 2023-11-11 PROCEDURE — 97161 PT EVAL LOW COMPLEX 20 MIN: CPT | Mod: GP

## 2023-11-11 PROCEDURE — 80048 BASIC METABOLIC PNL TOTAL CA: CPT | Performed by: SURGERY

## 2023-11-11 PROCEDURE — 96372 THER/PROPH/DIAG INJ SC/IM: CPT | Performed by: PHYSICIAN ASSISTANT

## 2023-11-11 PROCEDURE — 2500000001 HC RX 250 WO HCPCS SELF ADMINISTERED DRUGS (ALT 637 FOR MEDICARE OP): Performed by: INTERNAL MEDICINE

## 2023-11-11 PROCEDURE — 99232 SBSQ HOSP IP/OBS MODERATE 35: CPT | Performed by: FAMILY MEDICINE

## 2023-11-11 PROCEDURE — 85025 COMPLETE CBC W/AUTO DIFF WBC: CPT | Performed by: SURGERY

## 2023-11-11 RX ADMIN — ONDANSETRON 4 MG: 2 INJECTION INTRAMUSCULAR; INTRAVENOUS at 12:22

## 2023-11-11 RX ADMIN — MORPHINE SULFATE 2 MG: 2 INJECTION, SOLUTION INTRAMUSCULAR; INTRAVENOUS at 09:26

## 2023-11-11 RX ADMIN — HYDRALAZINE HYDROCHLORIDE 10 MG: 20 INJECTION INTRAMUSCULAR; INTRAVENOUS at 05:34

## 2023-11-11 RX ADMIN — Medication 2 L/MIN: at 08:37

## 2023-11-11 RX ADMIN — POTASSIUM CHLORIDE, DEXTROSE MONOHYDRATE AND SODIUM CHLORIDE 100 ML/HR: 150; 5; 450 INJECTION, SOLUTION INTRAVENOUS at 21:33

## 2023-11-11 RX ADMIN — MORPHINE SULFATE 2 MG: 2 INJECTION, SOLUTION INTRAMUSCULAR; INTRAVENOUS at 00:49

## 2023-11-11 RX ADMIN — POTASSIUM CHLORIDE, DEXTROSE MONOHYDRATE AND SODIUM CHLORIDE 100 ML/HR: 150; 5; 450 INJECTION, SOLUTION INTRAVENOUS at 07:48

## 2023-11-11 RX ADMIN — PANTOPRAZOLE SODIUM 40 MG: 40 INJECTION, POWDER, FOR SOLUTION INTRAVENOUS at 06:12

## 2023-11-11 RX ADMIN — MORPHINE SULFATE 2 MG: 2 INJECTION, SOLUTION INTRAMUSCULAR; INTRAVENOUS at 15:42

## 2023-11-11 RX ADMIN — ONDANSETRON 4 MG: 2 INJECTION INTRAMUSCULAR; INTRAVENOUS at 17:33

## 2023-11-11 RX ADMIN — ASPIRIN 81 MG 81 MG: 81 TABLET ORAL at 09:27

## 2023-11-11 RX ADMIN — MORPHINE SULFATE 2 MG: 2 INJECTION, SOLUTION INTRAMUSCULAR; INTRAVENOUS at 21:28

## 2023-11-11 RX ADMIN — MORPHINE SULFATE 2 MG: 2 INJECTION, SOLUTION INTRAMUSCULAR; INTRAVENOUS at 06:13

## 2023-11-11 RX ADMIN — ONDANSETRON 4 MG: 2 INJECTION INTRAMUSCULAR; INTRAVENOUS at 05:34

## 2023-11-11 RX ADMIN — LOSARTAN POTASSIUM 50 MG: 50 TABLET, FILM COATED ORAL at 21:25

## 2023-11-11 RX ADMIN — Medication 3 MG: at 21:28

## 2023-11-11 RX ADMIN — LOSARTAN POTASSIUM 50 MG: 50 TABLET, FILM COATED ORAL at 09:27

## 2023-11-11 RX ADMIN — ENOXAPARIN SODIUM 40 MG: 40 INJECTION SUBCUTANEOUS at 09:26

## 2023-11-11 ASSESSMENT — COGNITIVE AND FUNCTIONAL STATUS - GENERAL
MOBILITY SCORE: 20
STANDING UP FROM CHAIR USING ARMS: A LITTLE
DAILY ACTIVITIY SCORE: 19
TOILETING: A LITTLE
DRESSING REGULAR UPPER BODY CLOTHING: A LITTLE
CLIMB 3 TO 5 STEPS WITH RAILING: A LITTLE
HELP NEEDED FOR BATHING: A LITTLE
WALKING IN HOSPITAL ROOM: A LITTLE
WALKING IN HOSPITAL ROOM: A LITTLE
STANDING UP FROM CHAIR USING ARMS: A LITTLE
MOBILITY SCORE: 20
WALKING IN HOSPITAL ROOM: A LITTLE
CLIMB 3 TO 5 STEPS WITH RAILING: A LITTLE
TURNING FROM BACK TO SIDE WHILE IN FLAT BAD: A LITTLE
MOBILITY SCORE: 19
MOVING TO AND FROM BED TO CHAIR: A LITTLE
DRESSING REGULAR LOWER BODY CLOTHING: A LOT
MOVING TO AND FROM BED TO CHAIR: A LITTLE
MOVING TO AND FROM BED TO CHAIR: A LITTLE
STANDING UP FROM CHAIR USING ARMS: A LITTLE
CLIMB 3 TO 5 STEPS WITH RAILING: A LITTLE
DRESSING REGULAR LOWER BODY CLOTHING: A LOT

## 2023-11-11 ASSESSMENT — PAIN SCALES - GENERAL
PAINLEVEL_OUTOF10: 6
PAINLEVEL_OUTOF10: 6
PAINLEVEL_OUTOF10: 0 - NO PAIN
PAINLEVEL_OUTOF10: 5 - MODERATE PAIN
PAINLEVEL_OUTOF10: 6

## 2023-11-11 ASSESSMENT — PAIN - FUNCTIONAL ASSESSMENT
PAIN_FUNCTIONAL_ASSESSMENT: 0-10

## 2023-11-11 ASSESSMENT — PAIN DESCRIPTION - DESCRIPTORS: DESCRIPTORS: SORE

## 2023-11-11 ASSESSMENT — ACTIVITIES OF DAILY LIVING (ADL): ADL_ASSISTANCE: INDEPENDENT

## 2023-11-11 NOTE — CARE PLAN
The patient's goals for the shift include      The clinical goals for the shift include pain control throughout the shift.    Over the shift, the patient did not make progress toward the following goals. Barriers to progression include acuteness of illness. Recommendations to address these barriers include purposeful hourly rounding and pain assessment.

## 2023-11-11 NOTE — PROGRESS NOTES
"  Sunita Luther is a 83 y.o. female s/p Ventral Hernia and Parastomal Hernia Repair (POD#4)      Subjective   Ambulating at least 3 times a day with nursing and PT. UP and out of bed to chair a lot. Tolerating ice chips. No N/V. Pain controlled. No gas or liquid in colostomy yet.     Objective     Drain: 50 ml serosang    Physical Exam  Gen: NAD  Eyes:  EOM intact  ENT: MMM  Neck: No JVD  Respiratory: unlabored  Cardiac: RRR,   Abdomen: soft, appropriate surgical tenderness, incision CDI, Drain serosang, colostomy pink and viable  Extremities: no edema or cyanosis  Neuro: No focal deficits, alert and oriented x 3  Psych:  appropriate mood and behavior    Last Recorded Vitals  Blood pressure 150/70, pulse 72, temperature 36.1 °C (97 °F), temperature source Temporal, resp. rate 17, height 1.575 m (5' 2.01\"), weight 81 kg (178 lb 9.2 oz), SpO2 93 %.    Intake/Output last 3 Shifts:  I/O last 3 completed shifts:  In: 2101.7 (25.9 mL/kg) [P.O.:20; I.V.:50 (0.6 mL/kg); IV Piggyback:2031.7]  Out: 943 (11.6 mL/kg) [Urine:900 (0.3 mL/kg/hr); Drains:43]  Weight: 81 kg     Relevant Results  Results for orders placed or performed during the hospital encounter of 11/07/23 (from the past 24 hour(s))   CBC and Auto Differential   Result Value Ref Range    WBC 9.0 4.4 - 11.3 x10*3/uL    nRBC 0.0 0.0 - 0.0 /100 WBCs    RBC 3.76 (L) 4.00 - 5.20 x10*6/uL    Hemoglobin 11.2 (L) 12.0 - 16.0 g/dL    Hematocrit 35.3 (L) 36.0 - 46.0 %    MCV 94 80 - 100 fL    MCH 29.8 26.0 - 34.0 pg    MCHC 31.7 (L) 32.0 - 36.0 g/dL    RDW 13.0 11.5 - 14.5 %    Platelets 240 150 - 450 x10*3/uL    Neutrophils % 73.6 40.0 - 80.0 %    Immature Granulocytes %, Automated 0.6 0.0 - 0.9 %    Lymphocytes % 10.2 13.0 - 44.0 %    Monocytes % 9.2 2.0 - 10.0 %    Eosinophils % 5.8 0.0 - 6.0 %    Basophils % 0.6 0.0 - 2.0 %    Neutrophils Absolute 6.64 (H) 1.60 - 5.50 x10*3/uL    Immature Granulocytes Absolute, Automated 0.05 0.00 - 0.50 x10*3/uL    Lymphocytes Absolute " 0.92 0.80 - 3.00 x10*3/uL    Monocytes Absolute 0.83 (H) 0.05 - 0.80 x10*3/uL    Eosinophils Absolute 0.52 (H) 0.00 - 0.40 x10*3/uL    Basophils Absolute 0.05 0.00 - 0.10 x10*3/uL   Basic Metabolic Panel   Result Value Ref Range    Glucose 117 (H) 74 - 99 mg/dL    Sodium 140 136 - 145 mmol/L    Potassium 4.0 3.5 - 5.3 mmol/L    Chloride 106 98 - 107 mmol/L    Bicarbonate 26 21 - 32 mmol/L    Anion Gap 12 10 - 20 mmol/L    Urea Nitrogen 6 6 - 23 mg/dL    Creatinine 0.40 (L) 0.50 - 1.05 mg/dL    eGFR >90 >60 mL/min/1.73m*2    Calcium 8.2 (L) 8.6 - 10.3 mg/dL   Magnesium   Result Value Ref Range    Magnesium 1.86 1.60 - 2.40 mg/dL       Assessment:  - Ventral Hernia and Parastomal Hernia Repair (POD#4)       Recommendations:  - PT/OT  - ice chips and popsicles for comfort  - Ambulate the patient 5 times a day as tolerated/Frequent IS  - Clean around TAMMY drain tube site with alcohol wipe daily  - Continue lovenox   - Medical management per Dr. White  - IV morphine as needed        Hernan Silva MD

## 2023-11-11 NOTE — PROGRESS NOTES
Sunita Luther is a 83 y.o. female on day 5 of admission presenting with Ventral incisional hernia.      Subjective   Patient is status post NGT removal and doing well with no complaints.       Objective     Last Recorded Vitals  /62 (BP Location: Right arm, Patient Position: Lying)   Pulse 80   Temp 36.5 °C (97.7 °F) (Temporal)   Resp 17   Wt 81 kg (178 lb 9.2 oz)   SpO2 95%   Intake/Output last 3 Shifts:    Intake/Output Summary (Last 24 hours) at 11/11/2023 1236  Last data filed at 11/11/2023 0800  Gross per 24 hour   Intake 925 ml   Output 50 ml   Net 875 ml       Admission Weight  Weight: 81 kg (178 lb 9.2 oz) (11/07/23 1046)    Daily Weight  11/09/23 : 81 kg (178 lb 9.2 oz)    Image Results  XR chest 1 view  Narrative: Interpreted By:  Leonarda Amador,   STUDY:  XR CHEST 1 VIEW;      INDICATION:  Signs/Symptoms:chest pain.      COMPARISON:  07/01/2018 and abdomen radiograph dated 11/07/2023      ACCESSION NUMBER(S):  [id number]      ORDERING CLINICIAN:  SEBASTIAN YUSUF      FINDINGS:  There is elevation of the right hemidiaphragm, slightly increased on  today's radiograph compared to prior radiograph, however is similar  compared to immediate prior abdomen radiograph dated 11/07/2023.  Cardiac silhouette is normal in size. Bilateral lungs are  hypoinflated. There is ill-defined bandlike airspace opacities in  bilateral lung bases, more pronounced in the left lung base compared  to the right which could represent atelectasis versus airspace  infiltrate. Bilateral lungs demonstrate prominence of coarse  interstitial markings. Dense atherosclerotic calcifications of the  aortic knob are noted. Nasogastric tube is noted extending through  mediastinum and into the abdomen with its tip not included in the  field of view. No large pneumothorax. Obliteration of the left  costophrenic angle could be related to positioning, however small  left pleural effusion can not be excluded. Acute osseous findings.       Impression: 1. Bibasilar airspace opacities, more pronounced in the left lung  compared to the right could represent atelectasis, however airspace  infiltrate secondary to atypical infectious etiology can also be  considered in the differential in appropriate clinical setting.  2. Mild prominence of interstitial markings in bilateral lungs could  be related to combination of bronchovascular crowding secondary to  hypoinflated lungs versus mild interstitial edema. Recommend  correlation with fluid status.  3. Elevation of the right diaphragmatic dome as described above.          Signed by: Leonarda Amador 11/9/2023 4:22 PM  Dictation workstation:   [id number]      Physical Exam  Gen: lying comfortably in bed, not in acute distress  HEENT: atraumatic, normocephalic  Pulm: normal respiratory effort, clear to auscultation b/l  Cardiac: RRR, no murmurs noted, normal S1/S2  GI: Soft, nontender, no bowel sounds appreciated, abdomen dressed  MSK: normal ROM without joint swelling  Extremities: no LE edema, cyanosis  Neuro: AOX3, CN II-XII grossly intact, equal b/l strength, no loss in sensation   Psych: calm and appropriate for situation          Assessment/Plan                  Active Problems:  There are no active Hospital Problems.    Sunita Luther is a 83 y.o. female with a history of stress-induced cardiomyopathy, diverticulosis, hypertension hyperlipidemia is status post ventral hernia repair     Status post ventral hernia and parastomal hernia repair  Continue to follow with primary team  NGT discontinued  N.p.o. except for meds, per surgery  Advised to ambulate 5 times a day  Continue morphine as needed     Chest pain (resolved)  -secondary to MSK pain following surgery  -EKG negative for ACS, CXR negative for acute intracranial process, troponins within normal limits     Atelectasis  -Encourage IS  -O2 weaned off    Hypertension  Continue losartan  Blood pressure improving, will continue to monitor      Hyperlipidemia  Atorvastatin can be restarted when patient is able to tolerate oral intake     DVT ppx  Lovenox       Natanael White DO

## 2023-11-11 NOTE — PROGRESS NOTES
Physical Therapy    Physical Therapy Evaluation    Patient Name: Sunita Luther  MRN: [id number]  Today's Date: 11/11/2023   Time Calculation  Start Time: 1259  Stop Time: 1324  Time Calculation (min): 25 min    Assessment/Plan   PT Assessment  PT Assessment Results: Decreased endurance, Impaired balance, Decreased mobility, Pain  Rehab Prognosis: Excellent  Evaluation/Treatment Tolerance: Patient tolerated treatment well  Medical Staff Made Aware: Yes  Strengths: Ability to acquire knowledge, Access to adaptive/assistive products, Capable of completing ADLs semi/independent, Coping skills, Housing layout, Insight into problems, Living arrangement secure, Support of Caregivers, Support of [employer] community  End of Session Communication: Bedside nurse  End of Session Patient Position: Bed, 2 rail up  IP OR SWING BED PT PLAN  Inpatient or Swing Bed: Inpatient  PT Plan  Treatment/Interventions: Bed mobility, Transfer training, Gait training, Therapeutic exercise, Therapeutic activity, Endurance training  PT Plan: Skilled PT  PT Frequency: 3 times per week  PT Discharge Recommendations: Low intensity level of continued care  Equipment Recommended upon Discharge: Wheeled walker  PT Recommended Transfer Status: Stand by assist  PT - OK to Discharge: Yes      Subjective   General Visit Information:  General  Reason for Referral: Pt is a 84 y/o woman who is POD #3 from a ventral hernia and parastomal hernia repair  Past Medical History Relevant to Rehab: Past medical history of Cardiomyopathy (CMS/HCC), Colovaginal fistula, Diverticulosis, Lumbar disc displacement without myelopathy, Lumbar radiculitis, Lumbar spondylosis, Osteoarthritis of right hip, Osteoporosis, Parastomal hernia without obstruction or gangrene, Personal history of other diseases of the circulatory system (07/18/2014), and Postmenopausal bleeding.  Family/Caregiver Present: No  Prior to Session Communication: Bedside nurse  Patient Position Received: Up in  "chair, Alarm off, not on at start of session  Preferred Learning Style: verbal, visual, written  General Comment: Pt agreeable to PT eval. Pt reports some pain and nausea. She states she feels \"fuzzy\". Pt was able to ambulate in the room/hallway with FWW at A. Abdominal drain, IV present  Home Living:  Home Living  Type of Home: Assisted living  Lives With: Other (Comment) (has her own room at [facility]; staff present)  Home Adaptive Equipment: Walker rolling or standard  Home Layout: Multi-level (with elevator between floors)  Home Access: No concerns  Bathroom Shower/Tub: Walk-in shower  Bathroom Toilet: Adaptive toilet seating  Bathroom Equipment: Grab bars in shower, Shower chair with back, Grab bars around toilet  Home Living Comments: meals provided, housekeeping provided  Prior Level of Function:  Prior Function Per Pt/Caregiver Report  Level of [place]: Independent with ADLs and functional transfers  Receives Help From: Other (Comment)  ADL Assistance: Independent  Homemaking Assistance: Needs assistance  Meal Prep:  (provided)  Ambulatory Assistance: Independent  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 Pt is independent with ADL's. Facility assists with IADL's. Pt reports doing restorative therapy x4 days a week with PT and OT.  Precautions:  Precautions  Medical Precautions: Fall precautions  Post-Surgical Precautions: Abdominal surgery precautions    Objective   Pain:  Pain Assessment  Pain Assessment: 0-10  Pain Score: 5 - Moderate pain  Pain Type: Surgical pain  Pain Location: Abdomen  Cognition:  Cognition  Orientation Level: Oriented X4    General Assessments:  Activity Tolerance  Endurance: Tolerates 10 - 20 min exercise with multiple rests  Early Mobility/Exercise Safety Screen: Proceed with mobilization - No exclusion criteria met    Static Sitting " Balance  Static Sitting-Balance Support: Feet supported  Static Sitting-Level of Assistance: Independent  Dynamic Sitting Balance  Dynamic Sitting-Balance Support: Feet supported  Dynamic Sitting-Balance: Reaching for objects  Dynamic Sitting-Comments: supervision    Static Standing Balance  Static Standing-Balance Support: No upper extremity supported  Static Standing-Level of Assistance: Close supervision  Functional Assessments:  Bed Mobility  Bed Mobility: Yes  Bed Mobility 1  Bed Mobility 1: Sitting to supine  Level of Assistance 1: Close supervision  Bed Mobility Comments 1: increased time and effort; educated pt in safe logroll technique; pt. demo understanding    Transfers  Transfer: Yes  Transfer 1  Technique 1: Sit to stand, Stand to sit  Transfer Device 1: Walker  Transfer Level of Assistance 1: Close supervision  Trials/Comments 1: Education re: safe hand placement and body position to maintain precautions.  Pt. verbalized understanding  Transfers 2  Technique 2: Sit to stand, Stand to sit (to.from toilet w/ commode)  Transfer Device 2: Walker  Transfer Level of Assistance 2: Close supervision    Ambulation/Gait Training  Ambulation/Gait Training Performed: Yes  Ambulation/Gait Training 1  Surface 1: Level tile  Device 1: Rolling walker  Assistance 1: Close supervision  Quality of Gait 1:  (WFL)  Comments/Distance (ft) 1: 100 ft to/from nurses station;  Educated pt. re: walking program with nursing daily.  Pt. motivated to walk and return home  Extremity/Trunk Assessments:  RUE   RUE : Within Functional Limits  LUE   LUE: Within Functional Limits  RLE   RLE : Within Functional Limits  LLE   LLE : Within Functional Limits  Outcome Measures:  [facility] Basic Mobility  Turning from your back to your side while in a flat bed without using bedrails: None  Moving from lying on your back to sitting on the side of a flat bed without using bedrails: None  Moving to and from bed to chair (including a wheelchair): A  little  Standing up from a chair using your arms (e.g. wheelchair or bedside chair): A little  To walk in hospital room: A little  Climbing 3-5 steps with railing: A little  Basic Mobility - Total Score: 20    Encounter Problems       Encounter Problems (Active)       Balance       Pt will demo good static/dynamic standing balance during ADL and functional activity with FWW >8 minutes for improved independence and participation in ADL        Start:  11/10/23    Expected End:  11/24/23               Mobility       Pt will demo increased functional mobility to tolerate tasks necessary to complete ADL routine with FWW at mod I        Start:  11/10/23    Expected End:  11/24/23               Mobility       STG - Patient will ambulate >150 ft IND with LRAD       Start:  11/11/23    Expected End:  11/25/23               Transfers       Pt to demonstrate transfers with FWW at mod I        Start:  11/10/23    Expected End:  11/24/23               Transfers       STG - Transfer from bed to chair IND with LRAD       Start:  11/11/23    Expected End:  11/25/23            STG - Patient to transfer to and from sit to supine IND       Start:  11/11/23    Expected End:  11/25/23            STG - Patient will transfer sit to and from stand IND with LRAD       Start:  11/11/23                   Education Documentation  Precautions, taught by Lima Hickman, PT at 11/11/2023  2:03 PM.  Learner: Patient  Readiness: Acceptance  Method: Explanation  Response: Verbalizes Understanding    Body Mechanics, taught by Lima Hickman, PT at 11/11/2023  2:03 PM.  Learner: Patient  Readiness: Acceptance  Method: Explanation  Response: Verbalizes Understanding    Mobility Training, taught by Lima Hickman, PT at 11/11/2023  2:03 PM.  Learner: Patient  Readiness: Acceptance  Method: Explanation  Response: Verbalizes Understanding    Education Comments  No comments found.

## 2023-11-12 LAB
ANION GAP SERPL CALC-SCNC: 15 MMOL/L (ref 10–20)
BASOPHILS # BLD AUTO: 0.03 X10*3/UL (ref 0–0.1)
BASOPHILS NFR BLD AUTO: 0.4 %
BUN SERPL-MCNC: 6 MG/DL (ref 6–23)
CALCIUM SERPL-MCNC: 8.4 MG/DL (ref 8.6–10.3)
CHLORIDE SERPL-SCNC: 104 MMOL/L (ref 98–107)
CO2 SERPL-SCNC: 26 MMOL/L (ref 21–32)
CREAT SERPL-MCNC: 0.44 MG/DL (ref 0.5–1.05)
EOSINOPHIL # BLD AUTO: 0.44 X10*3/UL (ref 0–0.4)
EOSINOPHIL NFR BLD AUTO: 6.2 %
ERYTHROCYTE [DISTWIDTH] IN BLOOD BY AUTOMATED COUNT: 13.1 % (ref 11.5–14.5)
GFR SERPL CREATININE-BSD FRML MDRD: >90 ML/MIN/1.73M*2
GLUCOSE SERPL-MCNC: 116 MG/DL (ref 74–99)
HCT VFR BLD AUTO: 36.5 % (ref 36–46)
HGB BLD-MCNC: 11.6 G/DL (ref 12–16)
IMM GRANULOCYTES # BLD AUTO: 0.06 X10*3/UL (ref 0–0.5)
IMM GRANULOCYTES NFR BLD AUTO: 0.8 % (ref 0–0.9)
LYMPHOCYTES # BLD AUTO: 0.99 X10*3/UL (ref 0.8–3)
LYMPHOCYTES NFR BLD AUTO: 13.9 %
MAGNESIUM SERPL-MCNC: 1.71 MG/DL (ref 1.6–2.4)
MCH RBC QN AUTO: 29.6 PG (ref 26–34)
MCHC RBC AUTO-ENTMCNC: 31.8 G/DL (ref 32–36)
MCV RBC AUTO: 93 FL (ref 80–100)
MONOCYTES # BLD AUTO: 0.84 X10*3/UL (ref 0.05–0.8)
MONOCYTES NFR BLD AUTO: 11.8 %
NEUTROPHILS # BLD AUTO: 4.74 X10*3/UL (ref 1.6–5.5)
NEUTROPHILS NFR BLD AUTO: 66.9 %
NRBC BLD-RTO: 0 /100 WBCS (ref 0–0)
PLATELET # BLD AUTO: 296 X10*3/UL (ref 150–450)
POTASSIUM SERPL-SCNC: 4 MMOL/L (ref 3.5–5.3)
RBC # BLD AUTO: 3.92 X10*6/UL (ref 4–5.2)
SODIUM SERPL-SCNC: 141 MMOL/L (ref 136–145)
WBC # BLD AUTO: 7.1 X10*3/UL (ref 4.4–11.3)

## 2023-11-12 PROCEDURE — 36415 COLL VENOUS BLD VENIPUNCTURE: CPT | Performed by: SURGERY

## 2023-11-12 PROCEDURE — C9113 INJ PANTOPRAZOLE SODIUM, VIA: HCPCS | Performed by: SURGERY

## 2023-11-12 PROCEDURE — 85025 COMPLETE CBC W/AUTO DIFF WBC: CPT | Performed by: SURGERY

## 2023-11-12 PROCEDURE — 1100000001 HC PRIVATE ROOM DAILY

## 2023-11-12 PROCEDURE — 2500000004 HC RX 250 GENERAL PHARMACY W/ HCPCS (ALT 636 FOR OP/ED): Performed by: PHYSICIAN ASSISTANT

## 2023-11-12 PROCEDURE — 83735 ASSAY OF MAGNESIUM: CPT | Performed by: SURGERY

## 2023-11-12 PROCEDURE — 80048 BASIC METABOLIC PNL TOTAL CA: CPT | Performed by: SURGERY

## 2023-11-12 PROCEDURE — 2500000001 HC RX 250 WO HCPCS SELF ADMINISTERED DRUGS (ALT 637 FOR MEDICARE OP): Performed by: INTERNAL MEDICINE

## 2023-11-12 PROCEDURE — 2500000004 HC RX 250 GENERAL PHARMACY W/ HCPCS (ALT 636 FOR OP/ED): Performed by: SURGERY

## 2023-11-12 PROCEDURE — 2500000001 HC RX 250 WO HCPCS SELF ADMINISTERED DRUGS (ALT 637 FOR MEDICARE OP): Performed by: FAMILY MEDICINE

## 2023-11-12 PROCEDURE — 96372 THER/PROPH/DIAG INJ SC/IM: CPT | Performed by: PHYSICIAN ASSISTANT

## 2023-11-12 PROCEDURE — 99232 SBSQ HOSP IP/OBS MODERATE 35: CPT | Performed by: SURGERY

## 2023-11-12 PROCEDURE — 99232 SBSQ HOSP IP/OBS MODERATE 35: CPT | Performed by: FAMILY MEDICINE

## 2023-11-12 RX ORDER — MORPHINE SULFATE 4 MG/ML
4 INJECTION INTRAVENOUS EVERY 4 HOURS PRN
Status: DISCONTINUED | OUTPATIENT
Start: 2023-11-12 | End: 2023-11-12

## 2023-11-12 RX ORDER — ACETAMINOPHEN 325 MG/1
650 TABLET ORAL EVERY 6 HOURS
Status: DISCONTINUED | OUTPATIENT
Start: 2023-11-12 | End: 2023-11-14 | Stop reason: HOSPADM

## 2023-11-12 RX ORDER — KETOROLAC TROMETHAMINE 15 MG/ML
15 INJECTION, SOLUTION INTRAMUSCULAR; INTRAVENOUS EVERY 6 HOURS
Status: DISPENSED | OUTPATIENT
Start: 2023-11-12 | End: 2023-11-14

## 2023-11-12 RX ORDER — LANOLIN ALCOHOL/MO/W.PET/CERES
800 CREAM (GRAM) TOPICAL ONCE
Status: COMPLETED | OUTPATIENT
Start: 2023-11-12 | End: 2023-11-12

## 2023-11-12 RX ORDER — TRAMADOL HYDROCHLORIDE 50 MG/1
50 TABLET ORAL EVERY 6 HOURS PRN
Status: DISCONTINUED | OUTPATIENT
Start: 2023-11-12 | End: 2023-11-14 | Stop reason: HOSPADM

## 2023-11-12 RX ORDER — TRAMADOL HYDROCHLORIDE 50 MG/1
100 TABLET ORAL EVERY 6 HOURS PRN
Status: DISCONTINUED | OUTPATIENT
Start: 2023-11-12 | End: 2023-11-14 | Stop reason: HOSPADM

## 2023-11-12 RX ADMIN — LOSARTAN POTASSIUM 50 MG: 50 TABLET, FILM COATED ORAL at 09:17

## 2023-11-12 RX ADMIN — KETOROLAC TROMETHAMINE 15 MG: 15 INJECTION, SOLUTION INTRAMUSCULAR; INTRAVENOUS at 18:55

## 2023-11-12 RX ADMIN — ONDANSETRON 4 MG: 2 INJECTION INTRAMUSCULAR; INTRAVENOUS at 23:30

## 2023-11-12 RX ADMIN — ONDANSETRON 4 MG: 2 INJECTION INTRAMUSCULAR; INTRAVENOUS at 16:59

## 2023-11-12 RX ADMIN — LOSARTAN POTASSIUM 50 MG: 50 TABLET, FILM COATED ORAL at 21:08

## 2023-11-12 RX ADMIN — PANTOPRAZOLE SODIUM 40 MG: 40 INJECTION, POWDER, FOR SOLUTION INTRAVENOUS at 09:16

## 2023-11-12 RX ADMIN — MORPHINE SULFATE 4 MG: 4 INJECTION INTRAVENOUS at 12:18

## 2023-11-12 RX ADMIN — POTASSIUM CHLORIDE, DEXTROSE MONOHYDRATE AND SODIUM CHLORIDE 100 ML/HR: 150; 5; 450 INJECTION, SOLUTION INTRAVENOUS at 21:16

## 2023-11-12 RX ADMIN — ONDANSETRON 4 MG: 2 INJECTION INTRAMUSCULAR; INTRAVENOUS at 12:06

## 2023-11-12 RX ADMIN — ACETAMINOPHEN 650 MG: 325 TABLET ORAL at 18:55

## 2023-11-12 RX ADMIN — KETOROLAC TROMETHAMINE 15 MG: 15 INJECTION, SOLUTION INTRAMUSCULAR; INTRAVENOUS at 14:24

## 2023-11-12 RX ADMIN — ACETAMINOPHEN 650 MG: 325 TABLET ORAL at 14:23

## 2023-11-12 RX ADMIN — POTASSIUM CHLORIDE, DEXTROSE MONOHYDRATE AND SODIUM CHLORIDE 100 ML/HR: 150; 5; 450 INJECTION, SOLUTION INTRAVENOUS at 16:59

## 2023-11-12 RX ADMIN — ENOXAPARIN SODIUM 40 MG: 40 INJECTION SUBCUTANEOUS at 09:17

## 2023-11-12 RX ADMIN — Medication 3 MG: at 21:08

## 2023-11-12 RX ADMIN — ASPIRIN 81 MG 81 MG: 81 TABLET ORAL at 09:17

## 2023-11-12 RX ADMIN — ONDANSETRON 4 MG: 2 INJECTION INTRAMUSCULAR; INTRAVENOUS at 05:23

## 2023-11-12 RX ADMIN — POTASSIUM CHLORIDE, DEXTROSE MONOHYDRATE AND SODIUM CHLORIDE 100 ML/HR: 150; 5; 450 INJECTION, SOLUTION INTRAVENOUS at 05:48

## 2023-11-12 RX ADMIN — HYDRALAZINE HYDROCHLORIDE 10 MG: 20 INJECTION INTRAMUSCULAR; INTRAVENOUS at 05:48

## 2023-11-12 RX ADMIN — MORPHINE SULFATE 2 MG: 2 INJECTION, SOLUTION INTRAMUSCULAR; INTRAVENOUS at 09:18

## 2023-11-12 RX ADMIN — Medication 800 MG: at 14:23

## 2023-11-12 RX ADMIN — MORPHINE SULFATE 2 MG: 2 INJECTION, SOLUTION INTRAMUSCULAR; INTRAVENOUS at 05:23

## 2023-11-12 ASSESSMENT — COGNITIVE AND FUNCTIONAL STATUS - GENERAL
CLIMB 3 TO 5 STEPS WITH RAILING: A LITTLE
DAILY ACTIVITIY SCORE: 19
TURNING FROM BACK TO SIDE WHILE IN FLAT BAD: A LITTLE
MOVING TO AND FROM BED TO CHAIR: A LITTLE
STANDING UP FROM CHAIR USING ARMS: A LITTLE
WALKING IN HOSPITAL ROOM: A LITTLE
TOILETING: A LITTLE
MOBILITY SCORE: 19
HELP NEEDED FOR BATHING: A LITTLE
DRESSING REGULAR UPPER BODY CLOTHING: A LITTLE
DRESSING REGULAR LOWER BODY CLOTHING: A LOT

## 2023-11-12 ASSESSMENT — PAIN - FUNCTIONAL ASSESSMENT
PAIN_FUNCTIONAL_ASSESSMENT: 0-10
PAIN_FUNCTIONAL_ASSESSMENT: 0-10

## 2023-11-12 ASSESSMENT — PAIN SCALES - GENERAL
PAINLEVEL_OUTOF10: 0 - NO PAIN
PAINLEVEL_OUTOF10: 6
PAINLEVEL_OUTOF10: 8

## 2023-11-12 ASSESSMENT — PAIN DESCRIPTION - DESCRIPTORS: DESCRIPTORS: SORE

## 2023-11-12 NOTE — PROGRESS NOTES
Sunita Luther is a 83 y.o. female on day 5 of admission presenting with Ventral incisional hernia.      Subjective   Patient denies flatus    Objective     Last Recorded Vitals  /75   Pulse 72   Temp 36.8 °C (98.2 °F) (Temporal)   Resp 18   Wt 81 kg (178 lb 9.2 oz)   SpO2 93%   Intake/Output last 3 Shifts:    Intake/Output Summary (Last 24 hours) at 11/12/2023 1121  Last data filed at 11/12/2023 0649  Gross per 24 hour   Intake 926.67 ml   Output 20 ml   Net 906.67 ml         Admission Weight  Weight: 81 kg (178 lb 9.2 oz) (11/07/23 1046)    Daily Weight  11/09/23 : 81 kg (178 lb 9.2 oz)    Image Results  XR chest 1 view  Narrative: Interpreted By:  Leonarda Amador,   STUDY:  XR CHEST 1 VIEW;      INDICATION:  Signs/Symptoms:chest pain.      COMPARISON:  07/01/2018 and abdomen radiograph dated 11/07/2023      ACCESSION NUMBER(S):  [id number]      ORDERING CLINICIAN:  SEBASTIAN YUSUF      FINDINGS:  There is elevation of the right hemidiaphragm, slightly increased on  today's radiograph compared to prior radiograph, however is similar  compared to immediate prior abdomen radiograph dated 11/07/2023.  Cardiac silhouette is normal in size. Bilateral lungs are  hypoinflated. There is ill-defined bandlike airspace opacities in  bilateral lung bases, more pronounced in the left lung base compared  to the right which could represent atelectasis versus airspace  infiltrate. Bilateral lungs demonstrate prominence of coarse  interstitial markings. Dense atherosclerotic calcifications of the  aortic knob are noted. Nasogastric tube is noted extending through  mediastinum and into the abdomen with its tip not included in the  field of view. No large pneumothorax. Obliteration of the left  costophrenic angle could be related to positioning, however small  left pleural effusion can not be excluded. Acute osseous findings.      Impression: 1. Bibasilar airspace opacities, more pronounced in the left lung  compared to  the right could represent atelectasis, however airspace  infiltrate secondary to atypical infectious etiology can also be  considered in the differential in appropriate clinical setting.  2. Mild prominence of interstitial markings in bilateral lungs could  be related to combination of bronchovascular crowding secondary to  hypoinflated lungs versus mild interstitial edema. Recommend  correlation with fluid status.  3. Elevation of the right diaphragmatic dome as described above.          Signed by: Leonarda Amador 11/9/2023 4:22 PM  Dictation workstation:   [id number]      Physical Exam  Gen: lying comfortably in bed, not in acute distress  HEENT: atraumatic, normocephalic  Pulm: normal respiratory effort, clear to auscultation b/l  Cardiac: RRR, no murmurs noted, normal S1/S2  GI: Soft, nontender, no bowel sounds appreciated, abdomen dressed  MSK: normal ROM without joint swelling  Extremities: no LE edema, cyanosis  Neuro: AOX3, CN II-XII grossly intact, equal b/l strength, no loss in sensation   Psych: calm and appropriate for situation          Assessment/Plan                  Active Problems:  There are no active Hospital Problems.    Sunita Luther is a 83 y.o. female with a history of stress-induced cardiomyopathy, diverticulosis, hypertension hyperlipidemia is status post ventral hernia repair     Status post ventral hernia and parastomal hernia repair  Continue to follow with primary team  NGT discontinued  N.p.o. except for meds, per surgery  Advised to ambulate 5 times a day  Continue morphine as needed     Chest pain (resolved)  -secondary to MSK pain following surgery  -EKG negative for ACS, CXR negative for acute intracranial process, troponins within normal limits     Atelectasis  -Encourage IS  -O2 weaned off    Hypertension  Continue losartan  Blood pressure improving, will continue to monitor     Hyperlipidemia  Atorvastatin can be restarted when patient is able to tolerate oral intake     DVT  ppx  Lovenox       Natanael White, DO

## 2023-11-12 NOTE — CARE PLAN
The patient's goals for the shift include      The clinical goals for the shift include pain control  over night,    Over the shift, the patient did not make progress toward the following goals. Barriers to progression include acuteness of illness. Recommendations to address these barriers include purposeful hourly rounding, pain management, administration of prescribed treatments.

## 2023-11-12 NOTE — NURSING NOTE
0715 Nurse to nurse bedside shift report received. Pt. in NAD, reports sleeping 7 hours and feeling much better. BP rechecked: 163/71, hr 82. Pt. reports having flatus in colostomy.

## 2023-11-12 NOTE — PROGRESS NOTES
"  Sunita Luther is a 83 y.o. female s/p Ventral Hernia and Parastomal Hernia Repair (POD#5)      Subjective   Ambulating at least 3 times a day with nursing and PT. UP and out of bed to chair a lot. Tolerating ice chips. No N/V. Pain controlled. Having a lot of gas in her ostomy.    Objective     Drain: 70 ml serosang    Physical Exam  Gen: NAD  Eyes:  EOM intact  ENT: MMM  Neck: No JVD  Respiratory: unlabored  Cardiac: RRR,   Abdomen: soft, appropriate surgical tenderness, incision CDI, Drain serosang, colostomy pink and viable  Extremities: no edema or cyanosis  Neuro: No focal deficits, alert and oriented x 3  Psych:  appropriate mood and behavior    Last Recorded Vitals  Blood pressure 180/75, pulse 72, temperature 36.8 °C (98.2 °F), temperature source Temporal, resp. rate 18, height 1.575 m (5' 2.01\"), weight 81 kg (178 lb 9.2 oz), SpO2 93 %.    Intake/Output last 3 Shifts:  I/O last 3 completed shifts:  In: 946.7 (11.7 mL/kg) [P.O.:20; IV Piggyback:926.7]  Out: 70 (0.9 mL/kg) [Drains:70]  Weight: 81 kg     Relevant Results  Results for orders placed or performed during the hospital encounter of 11/07/23 (from the past 24 hour(s))   CBC and Auto Differential   Result Value Ref Range    WBC 7.1 4.4 - 11.3 x10*3/uL    nRBC 0.0 0.0 - 0.0 /100 WBCs    RBC 3.92 (L) 4.00 - 5.20 x10*6/uL    Hemoglobin 11.6 (L) 12.0 - 16.0 g/dL    Hematocrit 36.5 36.0 - 46.0 %    MCV 93 80 - 100 fL    MCH 29.6 26.0 - 34.0 pg    MCHC 31.8 (L) 32.0 - 36.0 g/dL    RDW 13.1 11.5 - 14.5 %    Platelets 296 150 - 450 x10*3/uL    Neutrophils % 66.9 40.0 - 80.0 %    Immature Granulocytes %, Automated 0.8 0.0 - 0.9 %    Lymphocytes % 13.9 13.0 - 44.0 %    Monocytes % 11.8 2.0 - 10.0 %    Eosinophils % 6.2 0.0 - 6.0 %    Basophils % 0.4 0.0 - 2.0 %    Neutrophils Absolute 4.74 1.60 - 5.50 x10*3/uL    Immature Granulocytes Absolute, Automated 0.06 0.00 - 0.50 x10*3/uL    Lymphocytes Absolute 0.99 0.80 - 3.00 x10*3/uL    Monocytes Absolute 0.84 (H) " 0.05 - 0.80 x10*3/uL    Eosinophils Absolute 0.44 (H) 0.00 - 0.40 x10*3/uL    Basophils Absolute 0.03 0.00 - 0.10 x10*3/uL   Basic Metabolic Panel   Result Value Ref Range    Glucose 116 (H) 74 - 99 mg/dL    Sodium 141 136 - 145 mmol/L    Potassium 4.0 3.5 - 5.3 mmol/L    Chloride 104 98 - 107 mmol/L    Bicarbonate 26 21 - 32 mmol/L    Anion Gap 15 10 - 20 mmol/L    Urea Nitrogen 6 6 - 23 mg/dL    Creatinine 0.44 (L) 0.50 - 1.05 mg/dL    eGFR >90 >60 mL/min/1.73m*2    Calcium 8.4 (L) 8.6 - 10.3 mg/dL   Magnesium   Result Value Ref Range    Magnesium 1.71 1.60 - 2.40 mg/dL       Assessment:  - Ventral Hernia and Parastomal Hernia Repair (POD#5)       Recommendations:  - PT/OT  - full liquid diet  - transition to PO pain regimen and toradol  - replace Magnesium  - Ambulate the patient 5 times a day as tolerated/Frequent IS  - Clean around TAMMY drain tube site with alcohol wipe daily  - Continue lovenox   - Medical management per Dr. Christopher Silva MD

## 2023-11-13 LAB
ANION GAP SERPL CALC-SCNC: 10 MMOL/L (ref 10–20)
BASOPHILS # BLD AUTO: 0.05 X10*3/UL (ref 0–0.1)
BASOPHILS NFR BLD AUTO: 0.7 %
BUN SERPL-MCNC: 7 MG/DL (ref 6–23)
CALCIUM SERPL-MCNC: 8.1 MG/DL (ref 8.6–10.3)
CHLORIDE SERPL-SCNC: 103 MMOL/L (ref 98–107)
CO2 SERPL-SCNC: 26 MMOL/L (ref 21–32)
CREAT SERPL-MCNC: 0.56 MG/DL (ref 0.5–1.05)
EOSINOPHIL # BLD AUTO: 0.49 X10*3/UL (ref 0–0.4)
EOSINOPHIL NFR BLD AUTO: 7 %
ERYTHROCYTE [DISTWIDTH] IN BLOOD BY AUTOMATED COUNT: 13.1 % (ref 11.5–14.5)
GFR SERPL CREATININE-BSD FRML MDRD: >90 ML/MIN/1.73M*2
GLUCOSE SERPL-MCNC: 103 MG/DL (ref 74–99)
HCT VFR BLD AUTO: 35.8 % (ref 36–46)
HGB BLD-MCNC: 11.3 G/DL (ref 12–16)
IMM GRANULOCYTES # BLD AUTO: 0.08 X10*3/UL (ref 0–0.5)
IMM GRANULOCYTES NFR BLD AUTO: 1.1 % (ref 0–0.9)
LYMPHOCYTES # BLD AUTO: 1.1 X10*3/UL (ref 0.8–3)
LYMPHOCYTES NFR BLD AUTO: 15.6 %
MAGNESIUM SERPL-MCNC: 1.76 MG/DL (ref 1.6–2.4)
MCH RBC QN AUTO: 29.6 PG (ref 26–34)
MCHC RBC AUTO-ENTMCNC: 31.6 G/DL (ref 32–36)
MCV RBC AUTO: 94 FL (ref 80–100)
MONOCYTES # BLD AUTO: 0.87 X10*3/UL (ref 0.05–0.8)
MONOCYTES NFR BLD AUTO: 12.3 %
NEUTROPHILS # BLD AUTO: 4.46 X10*3/UL (ref 1.6–5.5)
NEUTROPHILS NFR BLD AUTO: 63.3 %
NRBC BLD-RTO: 0 /100 WBCS (ref 0–0)
PLATELET # BLD AUTO: 295 X10*3/UL (ref 150–450)
POTASSIUM SERPL-SCNC: 4.3 MMOL/L (ref 3.5–5.3)
RBC # BLD AUTO: 3.82 X10*6/UL (ref 4–5.2)
SODIUM SERPL-SCNC: 135 MMOL/L (ref 136–145)
WBC # BLD AUTO: 7.1 X10*3/UL (ref 4.4–11.3)

## 2023-11-13 PROCEDURE — 97110 THERAPEUTIC EXERCISES: CPT | Mod: GP,CQ

## 2023-11-13 PROCEDURE — 80048 BASIC METABOLIC PNL TOTAL CA: CPT | Performed by: SURGERY

## 2023-11-13 PROCEDURE — 2500000004 HC RX 250 GENERAL PHARMACY W/ HCPCS (ALT 636 FOR OP/ED): Performed by: SURGERY

## 2023-11-13 PROCEDURE — 36415 COLL VENOUS BLD VENIPUNCTURE: CPT | Performed by: SURGERY

## 2023-11-13 PROCEDURE — 96372 THER/PROPH/DIAG INJ SC/IM: CPT | Performed by: PHYSICIAN ASSISTANT

## 2023-11-13 PROCEDURE — 1100000001 HC PRIVATE ROOM DAILY

## 2023-11-13 PROCEDURE — 85025 COMPLETE CBC W/AUTO DIFF WBC: CPT | Performed by: SURGERY

## 2023-11-13 PROCEDURE — 2500000001 HC RX 250 WO HCPCS SELF ADMINISTERED DRUGS (ALT 637 FOR MEDICARE OP): Performed by: FAMILY MEDICINE

## 2023-11-13 PROCEDURE — 99232 SBSQ HOSP IP/OBS MODERATE 35: CPT | Performed by: FAMILY MEDICINE

## 2023-11-13 PROCEDURE — 2500000004 HC RX 250 GENERAL PHARMACY W/ HCPCS (ALT 636 FOR OP/ED): Performed by: PHYSICIAN ASSISTANT

## 2023-11-13 PROCEDURE — 97116 GAIT TRAINING THERAPY: CPT | Mod: GP,CQ

## 2023-11-13 PROCEDURE — 83735 ASSAY OF MAGNESIUM: CPT | Performed by: SURGERY

## 2023-11-13 PROCEDURE — C9113 INJ PANTOPRAZOLE SODIUM, VIA: HCPCS | Performed by: SURGERY

## 2023-11-13 PROCEDURE — 97535 SELF CARE MNGMENT TRAINING: CPT | Mod: GO,CO

## 2023-11-13 PROCEDURE — 2500000001 HC RX 250 WO HCPCS SELF ADMINISTERED DRUGS (ALT 637 FOR MEDICARE OP): Performed by: INTERNAL MEDICINE

## 2023-11-13 RX ORDER — ATORVASTATIN CALCIUM 20 MG/1
20 TABLET, FILM COATED ORAL NIGHTLY
Status: DISCONTINUED | OUTPATIENT
Start: 2023-11-13 | End: 2023-11-14 | Stop reason: HOSPADM

## 2023-11-13 RX ADMIN — ACETAMINOPHEN 650 MG: 325 TABLET ORAL at 18:35

## 2023-11-13 RX ADMIN — ACETAMINOPHEN 650 MG: 325 TABLET ORAL at 13:09

## 2023-11-13 RX ADMIN — ATORVASTATIN CALCIUM 20 MG: 20 TABLET, FILM COATED ORAL at 20:50

## 2023-11-13 RX ADMIN — LOSARTAN POTASSIUM 50 MG: 50 TABLET, FILM COATED ORAL at 09:41

## 2023-11-13 RX ADMIN — POTASSIUM CHLORIDE, DEXTROSE MONOHYDRATE AND SODIUM CHLORIDE 100 ML/HR: 150; 5; 450 INJECTION, SOLUTION INTRAVENOUS at 13:07

## 2023-11-13 RX ADMIN — ENOXAPARIN SODIUM 40 MG: 40 INJECTION SUBCUTANEOUS at 09:40

## 2023-11-13 RX ADMIN — KETOROLAC TROMETHAMINE 15 MG: 15 INJECTION, SOLUTION INTRAMUSCULAR; INTRAVENOUS at 01:00

## 2023-11-13 RX ADMIN — Medication 3 MG: at 20:50

## 2023-11-13 RX ADMIN — ASPIRIN 81 MG 81 MG: 81 TABLET ORAL at 09:41

## 2023-11-13 RX ADMIN — KETOROLAC TROMETHAMINE 15 MG: 15 INJECTION, SOLUTION INTRAMUSCULAR; INTRAVENOUS at 10:20

## 2023-11-13 RX ADMIN — LOSARTAN POTASSIUM 50 MG: 50 TABLET, FILM COATED ORAL at 20:50

## 2023-11-13 RX ADMIN — HYDRALAZINE HYDROCHLORIDE 10 MG: 20 INJECTION INTRAMUSCULAR; INTRAVENOUS at 10:20

## 2023-11-13 RX ADMIN — POTASSIUM CHLORIDE, DEXTROSE MONOHYDRATE AND SODIUM CHLORIDE 100 ML/HR: 150; 5; 450 INJECTION, SOLUTION INTRAVENOUS at 21:11

## 2023-11-13 RX ADMIN — ONDANSETRON 4 MG: 2 INJECTION INTRAMUSCULAR; INTRAVENOUS at 20:50

## 2023-11-13 RX ADMIN — ACETAMINOPHEN 650 MG: 325 TABLET ORAL at 01:00

## 2023-11-13 RX ADMIN — ONDANSETRON 4 MG: 2 INJECTION INTRAMUSCULAR; INTRAVENOUS at 05:30

## 2023-11-13 RX ADMIN — KETOROLAC TROMETHAMINE 15 MG: 15 INJECTION, SOLUTION INTRAMUSCULAR; INTRAVENOUS at 17:19

## 2023-11-13 RX ADMIN — ACETAMINOPHEN 650 MG: 325 TABLET ORAL at 09:41

## 2023-11-13 RX ADMIN — PANTOPRAZOLE SODIUM 40 MG: 40 INJECTION, POWDER, FOR SOLUTION INTRAVENOUS at 10:20

## 2023-11-13 RX ADMIN — ONDANSETRON 4 MG: 2 INJECTION INTRAMUSCULAR; INTRAVENOUS at 13:34

## 2023-11-13 ASSESSMENT — COGNITIVE AND FUNCTIONAL STATUS - GENERAL
DRESSING REGULAR LOWER BODY CLOTHING: A LITTLE
DAILY ACTIVITIY SCORE: 24
PERSONAL GROOMING: A LITTLE
MOBILITY SCORE: 20
CLIMB 3 TO 5 STEPS WITH RAILING: A LITTLE
MOBILITY SCORE: 24
STANDING UP FROM CHAIR USING ARMS: A LITTLE
WALKING IN HOSPITAL ROOM: A LITTLE
HELP NEEDED FOR BATHING: A LITTLE
DAILY ACTIVITIY SCORE: 21
MOVING TO AND FROM BED TO CHAIR: A LITTLE

## 2023-11-13 ASSESSMENT — PAIN SCALES - GENERAL
PAINLEVEL_OUTOF10: 0 - NO PAIN
PAINLEVEL_OUTOF10: 0 - NO PAIN
PAINLEVEL_OUTOF10: 4
PAINLEVEL_OUTOF10: 4
PAINLEVEL_OUTOF10: 7
PAINLEVEL_OUTOF10: 5 - MODERATE PAIN

## 2023-11-13 ASSESSMENT — PAIN - FUNCTIONAL ASSESSMENT
PAIN_FUNCTIONAL_ASSESSMENT: 0-10

## 2023-11-13 ASSESSMENT — ACTIVITIES OF DAILY LIVING (ADL): HOME_MANAGEMENT_TIME_ENTRY: 14

## 2023-11-13 NOTE — NURSING NOTE
Pt. AMB on unit today by staff and OT, steady gait with walker, stand by assist. TAMMY drain patent, draining serosanguinous fluid, colostomy intact.  Pt. Hypertensive this morning, 199/84, gave PRN Hydralizine, re-took /73.

## 2023-11-13 NOTE — PROGRESS NOTES
Physical Therapy    Physical Therapy Treatment    Patient Name: Sunita Luther  MRN: [id number]  Today's Date: 11/13/2023  Time Calculation  Start Time: 1516  Stop Time: 1541  Time Calculation (min): 25 min       Assessment/Plan   PT Assessment  PT Assessment Results: Decreased endurance, Impaired balance, Decreased mobility, Pain  Rehab Prognosis: Excellent  Evaluation/Treatment Tolerance: Patient tolerated treatment well  Medical Staff Made Aware: Yes  Strengths: Ability to acquire knowledge, Access to adaptive/assistive products, Capable of completing ADLs semi/independent, Coping skills, Housing layout, Insight into problems, Living arrangement secure, Support of Caregivers, Support of [place] community  End of Session Communication: Bedside nurse  End of Session Patient Position: Up in chair, Alarm off, not on at start of session     PT Plan  Treatment/Interventions: Bed mobility, Transfer training, Gait training, Therapeutic exercise, Therapeutic activity, Endurance training  PT Plan: Skilled PT  PT Frequency: 3 times per week  PT Discharge Recommendations: Low intensity level of continued care  Equipment Recommended upon Discharge: Wheeled walker  PT Recommended Transfer Status: Stand by assist  PT - OK to Discharge: Yes      General Visit Information:   PT  Visit  PT Received On: 11/13/23  General  Reason for Referral: Impaired mobilty  Past Medical History Relevant to Rehab: Ventral hernia, Hernia repair, nuasua, abd pain  Prior to Session Communication: Bedside nurse  Patient Position Received: Bed, 3 rail up, Alarm off, not on at start of session  Preferred Learning Style: verbal, visual  General Comment: Patient very pleasant and motivated    Subjective   Precautions:  Precautions  Medical Precautions: Abdominal precautions  Post-Surgical Precautions: Abdominal surgery precautions  Precautions Comment: IV in tact  Vital Signs:       Objective   Pain:  Pain Assessment  Pain Assessment: 0-10  Pain Score: 0 -  No pain  Cognition:  Cognition  Overall Cognitive Status: Within Functional Limits  Postural Control:     Extremity/Trunk Assessments:    Activity Tolerance:  Activity Tolerance  Endurance: Tolerates 30 min exercise with multiple rests  Treatments:  Therapeutic Exercise  Therapeutic Exercise Performed: Yes  Therapeutic Exercise Activity 1: Seated ther ex 2 x 10-15 (AP, HS, LAQ, ABD/ADD, Hip Flex,)         Bed Mobility  Bed Mobility: Yes  Bed Mobility 1  Bed Mobility 1: Supine to sitting  Level of Assistance 1: Close supervision  Bed Mobility Comments 1: This PTA managing IV pole    Ambulation/Gait Training  Ambulation/Gait Training Performed: Yes  Ambulation/Gait Training 1  Surface 1: Level tile  Device 1: Rolling walker  Assistance 1: Close supervision  Comments/Distance (ft) 1: 200' x 1 (close supervision. This PTA managing IV pole)  Transfers  Transfer: Yes  Transfer 1  Transfer From 1: Bed to  Transfer to 1: Stand  Technique 1: Sit to stand  Transfer Device 1: Walker  Transfer Level of Assistance 1: Close supervision  Transfers 2  Transfer From 2: Stand to  Transfer to 2: Toilet  Technique 2: Sit to stand, Stand to sit  Transfer Device 2: Walker  Transfer Level of Assistance 2: Close supervision         Outcome Measures:  [facility] Basic Mobility  Turning from your back to your side while in a flat bed without using bedrails: None  Moving from lying on your back to sitting on the side of a flat bed without using bedrails: None  Moving to and from bed to chair (including a wheelchair): A little  Standing up from a chair using your arms (e.g. wheelchair or bedside chair): A little  To walk in hospital room: A little  Climbing 3-5 steps with railing: A little  Basic Mobility - Total Score: 20    Education Documentation  Home Exercise Program, taught by Wendy Jones PTA at 11/13/2023  3:53 PM.  Learner: Patient  Readiness: Acceptance  Method: Explanation, Demonstration, Teach-back  Response: Verbalizes  Understanding, Demonstrated Understanding    Precautions, taught by Wendy Jones PTA at 11/13/2023  3:53 PM.  Learner: Patient  Readiness: Acceptance  Method: Explanation, Demonstration, Teach-back  Response: Verbalizes Understanding, Demonstrated Understanding    Body Mechanics, taught by Wendy Jones PTA at 11/13/2023  3:53 PM.  Learner: Patient  Readiness: Acceptance  Method: Explanation, Demonstration, Teach-back  Response: Verbalizes Understanding, Demonstrated Understanding    Mobility Training, taught by Wendy Jones PTA at 11/13/2023  3:53 PM.  Learner: Patient  Readiness: Acceptance  Method: Explanation, Demonstration, Teach-back  Response: Verbalizes Understanding, Demonstrated Understanding    Education Comments  No comments found.        OP EDUCATION:       Encounter Problems       Encounter Problems (Active)       Balance       Pt will demo good static/dynamic standing balance during ADL and functional activity with FWW >8 minutes for improved independence and participation in ADL  (Progressing)       Start:  11/10/23    Expected End:  11/14/23               Mobility       Pt will demo increased functional mobility to tolerate tasks necessary to complete ADL routine with FWW at mod I  (Progressing)       Start:  11/10/23    Expected End:  11/14/23               Mobility       STG - Patient will ambulate >150 ft IND with LRAD (Progressing)       Start:  11/11/23    Expected End:  11/14/23               Transfers       Pt to demonstrate transfers with FWW at mod I  (Progressing)       Start:  11/10/23    Expected End:  11/14/23               Transfers       STG - Transfer from bed to chair IND with LRAD (Progressing)       Start:  11/11/23    Expected End:  11/14/23            STG - Patient to transfer to and from sit to supine IND (Progressing)       Start:  11/11/23    Expected End:  11/14/23            STG - Patient will transfer sit to and from stand IND with LRAD (Progressing)        Start:  11/11/23    Expected End:  11/14/23

## 2023-11-13 NOTE — PROGRESS NOTES
Sunita Luther is a 83 y.o. female on day 5 of admission presenting with Ventral incisional hernia.      Subjective   Tolerating a clear diet    Objective     Last Recorded Vitals  /75 (BP Location: Right arm, Patient Position: Lying)   Pulse 75   Temp 36.5 °C (97.7 °F) (Temporal)   Resp 18   Wt 81 kg (178 lb 9.2 oz)   SpO2 93%   Intake/Output last 3 Shifts:  No intake or output data in the 24 hours ending 11/13/23 0940      Admission Weight  Weight: 81 kg (178 lb 9.2 oz) (11/07/23 1046)    Daily Weight  11/09/23 : 81 kg (178 lb 9.2 oz)    Image Results  XR chest 1 view  Narrative: Interpreted By:  Leonarda Amador,   STUDY:  XR CHEST 1 VIEW;      INDICATION:  Signs/Symptoms:chest pain.      COMPARISON:  07/01/2018 and abdomen radiograph dated 11/07/2023      ACCESSION NUMBER(S):  [id number]      ORDERING CLINICIAN:  SEBASTIAN YUSUF      FINDINGS:  There is elevation of the right hemidiaphragm, slightly increased on  today's radiograph compared to prior radiograph, however is similar  compared to immediate prior abdomen radiograph dated 11/07/2023.  Cardiac silhouette is normal in size. Bilateral lungs are  hypoinflated. There is ill-defined bandlike airspace opacities in  bilateral lung bases, more pronounced in the left lung base compared  to the right which could represent atelectasis versus airspace  infiltrate. Bilateral lungs demonstrate prominence of coarse  interstitial markings. Dense atherosclerotic calcifications of the  aortic knob are noted. Nasogastric tube is noted extending through  mediastinum and into the abdomen with its tip not included in the  field of view. No large pneumothorax. Obliteration of the left  costophrenic angle could be related to positioning, however small  left pleural effusion can not be excluded. Acute osseous findings.      Impression: 1. Bibasilar airspace opacities, more pronounced in the left lung  compared to the right could represent atelectasis, however  airspace  infiltrate secondary to atypical infectious etiology can also be  considered in the differential in appropriate clinical setting.  2. Mild prominence of interstitial markings in bilateral lungs could  be related to combination of bronchovascular crowding secondary to  hypoinflated lungs versus mild interstitial edema. Recommend  correlation with fluid status.  3. Elevation of the right diaphragmatic dome as described above.          Signed by: Leonarda Amador 11/9/2023 4:22 PM  Dictation workstation:   [id number]      Physical Exam  Gen: lying comfortably in bed, not in acute distress  HEENT: atraumatic, normocephalic  Pulm: normal respiratory effort, clear to auscultation b/l  Cardiac: RRR, no murmurs noted, normal S1/S2  GI: Soft, nontender, no bowel sounds appreciated, abdomen dressed  MSK: normal ROM without joint swelling  Extremities: no LE edema, cyanosis  Neuro: AOX3, CN II-XII grossly intact, equal b/l strength, no loss in sensation   Psych: calm and appropriate for situation          Assessment/Plan                  Active Problems:  There are no active Hospital Problems.    Sunita Luther is a 83 y.o. female with a history of stress-induced cardiomyopathy, diverticulosis, hypertension hyperlipidemia is status post ventral hernia repair     Status post ventral hernia and parastomal hernia repair  Continue to follow with primary team  Surgery advanced diet to FLD with plans to transition to Soft Diet  Continue to ambulate   Continue morphine as needed     Chest pain (resolved)  -secondary to MSK pain following surgery  -EKG negative for ACS, CXR negative for acute intracranial process, troponins within normal limits     Atelectasis  -Encourage IS  -O2 weaned off    Hypertension  Continue losartan  Blood pressure has spiked a few times  Continue losartan, Spiked BP not resting BP, will defer med titration, if needed to PCP     Hyperlipidemia  Atorvastatin  restarted      DVT ppx  Lovenox       Natanael  KOREY White, DO

## 2023-11-13 NOTE — PROGRESS NOTES
"Sunita Luther is a 83 y.o. female on day 6 of admission presenting with Ventral incisional hernia.    Subjective   Sitting up in chair       Objective     Physical Exam  Abd soft, ostomy bag with flatus  Last Recorded Vitals  Blood pressure 172/75, pulse 75, temperature 36.5 °C (97.7 °F), temperature source Temporal, resp. rate 18, height 1.575 m (5' 2.01\"), weight 81 kg (178 lb 9.2 oz), SpO2 93 %.  Intake/Output last 3 Shifts:  I/O last 3 completed shifts:  In: 926.7 (11.4 mL/kg) [IV Piggyback:926.7]  Out: 20 (0.2 mL/kg) [Drains:20]  Weight: 81 kg     Relevant Results                             Assessment/Plan  doing well  Full liq diet today  Possibly soft later  Can cap ivf  Aim for discharge tomorrow  Active Problems:  There are no active Hospital Problems.           I spent 10 minutes in the professional and overall care of this patient.      Ambar Guzman MD      "

## 2023-11-13 NOTE — PROGRESS NOTES
Occupational Therapy    Occupational Therapy Treatment    Name: Sunita Luther  MRN: [id number]  : 1940  Date: 23  Time Calculation  Start Time: 1230  Stop Time: 1244  Time Calculation (min): 14 min    Assessment:  OT Assessment: Pt is a 84 y/o woman who is POD #5 from ventral hernia and parastomal hernia repair. Pt typically is independent without any AD during the day and completes ADL's independently. Pt presents with pain, nausea, decreased balance, and endurance. Pt to benefit from skilled OT services to increase independence with ADL's, transfers, and mobility.  Prognosis: Excellent  Barriers to Discharge: None  Evaluation/Treatment Tolerance: Patient tolerated treatment well  Medical Staff Made Aware: Yes  End of Session Communication: Bedside nurse  End of Session Patient Position: Up in chair, Alarm on  Plan:  Treatment Interventions: ADL retraining, Functional transfer training, Endurance training, Patient/family training, Compensatory technique education  OT Frequency: 2 times per week  OT Discharge Recommendations: Low intensity level of continued care  Equipment Recommended upon Discharge: Wheeled walker  OT Recommended Transfer Status: Stand by assist, Assist of 1  OT - OK to Discharge: Yes    Subjective   General:  OT Last Visit  OT Received On: 23  General  Reason for Referral: Pt is a 84 y/o woman who is POD #5 from a ventral hernia and parastomal hernia repair  Past Medical History Relevant to Rehab: Past medical history of Cardiomyopathy (CMS/HCC), Colovaginal fistula, Diverticulosis, Lumbar disc displacement without myelopathy, Lumbar radiculitis, Lumbar spondylosis, Osteoarthritis of right hip, Osteoporosis, Parastomal hernia without obstruction or gangrene, Personal history of other diseases of the circulatory system (2014), and Postmenopausal bleeding.  Family/Caregiver Present: No  Prior to Session Communication: Bedside nurse  Patient Position Received: Up in chair,  Alarm off, not on at start of session  Preferred Learning Style: verbal, visual, written  General Comment: Pt agreeable to OT treatment, reports minor pain only with mobility that improves with repositioning and activity.    Pain Assessment:  Pt reports her pain to be mild and well managed.      Objective   Activities of Daily Living: LE Dressing  LE Dressing: Yes  LE Dressing Adaptive Equipment: Reacher, Sock aide  Sock Level of Assistance: Minimum assistance  LE Dressing Where Assessed: Chair  LE Dressing Comments:  (Increased time with adaptive techniques d/t sweelling in feet.)    Toileting  Toileting Level of Assistance: Independent  Where Assessed: Toilet  Toileting Comments: including clothing management and hygiene.    Functional Standing Tolerance:     Bed Mobility/Transfers:      Transfers  Transfer: Yes  Transfer 1  Transfer From 1: Sit to  Transfer to 1: Stand  Technique 1: Sit to stand, Stand to sit  Transfer Device 1: Walker  Transfer Level of Assistance 1: Distant supervision  Transfers 2  Transfer From 2: Chair with arms to  Transfer to 2: Commode-standard  Technique 2: Stand pivot  Transfer Device 2: Walker  Transfer Level of Assistance 2: Close supervision    Outcome Measures:  [facility] Daily Activity  Putting on and taking off regular lower body clothing: A little  Bathing (including washing, rinsing, drying): A little  Putting on and taking off regular upper body clothing: None  Toileting, which includes using toilet, bedpan or urinal: None  Taking care of personal grooming such as brushing teeth: A little  Eating Meals: None  Daily Activity - Total Score: 21    Education Documentation  Body Mechanics, taught by JEAN-CLAUDE Morrow at 11/13/2023  1:22 PM.  Learner: Patient  Readiness: Eager  Method: Explanation, Demonstration  Response: Verbalizes Understanding, Demonstrated Understanding, Needs Reinforcement    Precautions, taught by JEAN-CLAUDE Morrow at 11/13/2023  1:22 PM.  Learner:  Patient  Readiness: Eager  Method: Explanation, Demonstration  Response: Verbalizes Understanding, Demonstrated Understanding, Needs Reinforcement    Home Exercise Program, taught by JEAN-CLAUDE Morrow at 11/13/2023  1:22 PM.  Learner: Patient  Readiness: Eager  Method: Explanation, Demonstration  Response: Verbalizes Understanding, Demonstrated Understanding, Needs Reinforcement    ADL Training, taught by JEAN-CLAUDE Morrow at 11/13/2023  1:22 PM.  Learner: Patient  Readiness: Eager  Method: Explanation, Demonstration  Response: Verbalizes Understanding, Demonstrated Understanding, Needs Reinforcement    Education Comments  Pt very receptive to instruction in course of treatment.       Goals:  Encounter Problems       Encounter Problems (Active)       Balance       Pt will demo good static/dynamic standing balance during ADL and functional activity with FWW >8 minutes for improved independence and participation in ADL  (Progressing)       Start:  11/10/23    Expected End:  11/14/23               Dressings Lower Extremities       Patient to complete lower body dressing with AE as needed at mod I  (Progressing)       Start:  11/10/23    Expected End:  11/14/23               Mobility       Pt will demo increased functional mobility to tolerate tasks necessary to complete ADL routine with FWW at [facility]  (Progressing)       Start:  11/10/23    Expected End:  11/14/23                  Encounter Problems (Resolved)       Endurance       Pt will increase endurance to tolerate 15 min of activity with no more than 1 rest break in order to increase ability to engage in ADL completion.  (Met)       Start:  11/10/23    Expected End:  11/14/23    Resolved:  11/13/23            Toileting       Patient will complete toileting tasks with FWW at mod I  (Met)       Start:  11/10/23    Expected End:  11/14/23    Resolved:  11/13/23

## 2023-11-13 NOTE — PROGRESS NOTES
11/13/23 1109   Discharge Planning   Living Arrangements Other (Comment)  (from [facility])   Support Systems [facility]   Assistance Needed A&Ox3, uses a rollator, room air at baseline   Type of Residence Assisted living   Care [facility]   Home or Post Acute Services Other (Comment)  (needs script for outpatiet PT/OT)   Type of Post Acute Facility Services Assisted living   Patient expects to be discharged to: return to [facility] with outpatient PT/OT script   Does the patient need discharge transport arranged? Yes   RoundTrip coordination needed? Yes   Has discharge transport been arranged? No     11/14/2023 0847: [facility] spoke with nurse at [facility] and they will be sending a sister to transport the patient back to [facility].

## 2023-11-14 ENCOUNTER — PHARMACY VISIT (OUTPATIENT)
Dept: PHARMACY | Facility: CLINIC | Age: 83
End: 2023-11-14
Payer: COMMERCIAL

## 2023-11-14 VITALS
WEIGHT: 178.57 LBS | TEMPERATURE: 97.5 F | OXYGEN SATURATION: 93 % | HEIGHT: 62 IN | HEART RATE: 74 BPM | RESPIRATION RATE: 17 BRPM | BODY MASS INDEX: 32.86 KG/M2 | DIASTOLIC BLOOD PRESSURE: 88 MMHG | SYSTOLIC BLOOD PRESSURE: 180 MMHG

## 2023-11-14 LAB
ANION GAP SERPL CALC-SCNC: 10 MMOL/L (ref 10–20)
BASOPHILS # BLD AUTO: 0.06 X10*3/UL (ref 0–0.1)
BASOPHILS NFR BLD AUTO: 0.6 %
BUN SERPL-MCNC: 4 MG/DL (ref 6–23)
CALCIUM SERPL-MCNC: 8.1 MG/DL (ref 8.6–10.3)
CHLORIDE SERPL-SCNC: 106 MMOL/L (ref 98–107)
CO2 SERPL-SCNC: 26 MMOL/L (ref 21–32)
CREAT SERPL-MCNC: 0.5 MG/DL (ref 0.5–1.05)
EOSINOPHIL # BLD AUTO: 0.55 X10*3/UL (ref 0–0.4)
EOSINOPHIL NFR BLD AUTO: 5.1 %
ERYTHROCYTE [DISTWIDTH] IN BLOOD BY AUTOMATED COUNT: 12.9 % (ref 11.5–14.5)
GFR SERPL CREATININE-BSD FRML MDRD: >90 ML/MIN/1.73M*2
GLUCOSE SERPL-MCNC: 100 MG/DL (ref 74–99)
HCT VFR BLD AUTO: 35 % (ref 36–46)
HGB BLD-MCNC: 11.1 G/DL (ref 12–16)
IMM GRANULOCYTES # BLD AUTO: 0.12 X10*3/UL (ref 0–0.5)
IMM GRANULOCYTES NFR BLD AUTO: 1.1 % (ref 0–0.9)
LYMPHOCYTES # BLD AUTO: 0.94 X10*3/UL (ref 0.8–3)
LYMPHOCYTES NFR BLD AUTO: 8.7 %
MAGNESIUM SERPL-MCNC: 2.13 MG/DL (ref 1.6–2.4)
MCH RBC QN AUTO: 29.3 PG (ref 26–34)
MCHC RBC AUTO-ENTMCNC: 31.7 G/DL (ref 32–36)
MCV RBC AUTO: 92 FL (ref 80–100)
MONOCYTES # BLD AUTO: 1.09 X10*3/UL (ref 0.05–0.8)
MONOCYTES NFR BLD AUTO: 10.1 %
NEUTROPHILS # BLD AUTO: 8.02 X10*3/UL (ref 1.6–5.5)
NEUTROPHILS NFR BLD AUTO: 74.4 %
NRBC BLD-RTO: 0 /100 WBCS (ref 0–0)
PLATELET # BLD AUTO: 299 X10*3/UL (ref 150–450)
POTASSIUM SERPL-SCNC: 4.3 MMOL/L (ref 3.5–5.3)
RBC # BLD AUTO: 3.79 X10*6/UL (ref 4–5.2)
SODIUM SERPL-SCNC: 138 MMOL/L (ref 136–145)
WBC # BLD AUTO: 10.8 X10*3/UL (ref 4.4–11.3)

## 2023-11-14 PROCEDURE — 85025 COMPLETE CBC W/AUTO DIFF WBC: CPT | Performed by: SURGERY

## 2023-11-14 PROCEDURE — 83735 ASSAY OF MAGNESIUM: CPT | Performed by: SURGERY

## 2023-11-14 PROCEDURE — 2500000001 HC RX 250 WO HCPCS SELF ADMINISTERED DRUGS (ALT 637 FOR MEDICARE OP): Performed by: FAMILY MEDICINE

## 2023-11-14 PROCEDURE — 2500000001 HC RX 250 WO HCPCS SELF ADMINISTERED DRUGS (ALT 637 FOR MEDICARE OP): Performed by: SURGERY

## 2023-11-14 PROCEDURE — 2500000004 HC RX 250 GENERAL PHARMACY W/ HCPCS (ALT 636 FOR OP/ED): Performed by: SURGERY

## 2023-11-14 PROCEDURE — 2500000001 HC RX 250 WO HCPCS SELF ADMINISTERED DRUGS (ALT 637 FOR MEDICARE OP): Performed by: INTERNAL MEDICINE

## 2023-11-14 PROCEDURE — 99238 HOSP IP/OBS DSCHRG MGMT 30/<: CPT | Performed by: NURSE PRACTITIONER

## 2023-11-14 PROCEDURE — 36415 COLL VENOUS BLD VENIPUNCTURE: CPT | Performed by: SURGERY

## 2023-11-14 PROCEDURE — 99232 SBSQ HOSP IP/OBS MODERATE 35: CPT | Performed by: FAMILY MEDICINE

## 2023-11-14 PROCEDURE — 80048 BASIC METABOLIC PNL TOTAL CA: CPT | Performed by: SURGERY

## 2023-11-14 PROCEDURE — C9113 INJ PANTOPRAZOLE SODIUM, VIA: HCPCS | Performed by: SURGERY

## 2023-11-14 PROCEDURE — 2500000004 HC RX 250 GENERAL PHARMACY W/ HCPCS (ALT 636 FOR OP/ED): Performed by: PHYSICIAN ASSISTANT

## 2023-11-14 PROCEDURE — RXMED WILLOW AMBULATORY MEDICATION CHARGE

## 2023-11-14 PROCEDURE — 96372 THER/PROPH/DIAG INJ SC/IM: CPT | Performed by: PHYSICIAN ASSISTANT

## 2023-11-14 RX ORDER — LOSARTAN POTASSIUM AND HYDROCHLOROTHIAZIDE 25; 100 MG/1; MG/1
1 TABLET ORAL DAILY
Qty: 30 TABLET | Refills: 0 | Status: SHIPPED | OUTPATIENT
Start: 2023-11-14

## 2023-11-14 RX ORDER — HYDROCHLOROTHIAZIDE 25 MG/1
25 TABLET ORAL DAILY
Status: DISCONTINUED | OUTPATIENT
Start: 2023-11-14 | End: 2023-11-14 | Stop reason: HOSPADM

## 2023-11-14 RX ORDER — TRAMADOL HYDROCHLORIDE 50 MG/1
50 TABLET ORAL EVERY 6 HOURS PRN
Qty: 12 TABLET | Refills: 0 | Status: SHIPPED | OUTPATIENT
Start: 2023-11-14 | End: 2023-11-16 | Stop reason: SDUPTHER

## 2023-11-14 RX ORDER — ONDANSETRON 4 MG/1
4 TABLET, ORALLY DISINTEGRATING ORAL EVERY 8 HOURS PRN
Qty: 15 TABLET | Refills: 0 | Status: SHIPPED | OUTPATIENT
Start: 2023-11-14 | End: 2023-11-19

## 2023-11-14 RX ADMIN — ASPIRIN 81 MG 81 MG: 81 TABLET ORAL at 08:17

## 2023-11-14 RX ADMIN — PANTOPRAZOLE SODIUM 40 MG: 40 INJECTION, POWDER, FOR SOLUTION INTRAVENOUS at 08:16

## 2023-11-14 RX ADMIN — ONDANSETRON 4 MG: 2 INJECTION INTRAMUSCULAR; INTRAVENOUS at 13:02

## 2023-11-14 RX ADMIN — ACETAMINOPHEN 650 MG: 325 TABLET ORAL at 08:17

## 2023-11-14 RX ADMIN — ENOXAPARIN SODIUM 40 MG: 40 INJECTION SUBCUTANEOUS at 08:16

## 2023-11-14 RX ADMIN — ONDANSETRON 4 MG: 2 INJECTION INTRAMUSCULAR; INTRAVENOUS at 06:10

## 2023-11-14 RX ADMIN — HYDRALAZINE HYDROCHLORIDE 10 MG: 20 INJECTION INTRAMUSCULAR; INTRAVENOUS at 09:51

## 2023-11-14 RX ADMIN — TRAMADOL HYDROCHLORIDE 50 MG: 50 TABLET, COATED ORAL at 06:10

## 2023-11-14 RX ADMIN — HYDROCHLOROTHIAZIDE 25 MG: 25 TABLET ORAL at 13:02

## 2023-11-14 RX ADMIN — ACETAMINOPHEN 650 MG: 325 TABLET ORAL at 01:00

## 2023-11-14 RX ADMIN — LOSARTAN POTASSIUM 50 MG: 50 TABLET, FILM COATED ORAL at 08:17

## 2023-11-14 ASSESSMENT — COGNITIVE AND FUNCTIONAL STATUS - GENERAL
MOBILITY SCORE: 24
DAILY ACTIVITIY SCORE: 24

## 2023-11-14 ASSESSMENT — PAIN SCALES - GENERAL
PAINLEVEL_OUTOF10: 0 - NO PAIN
PAINLEVEL_OUTOF10: 6

## 2023-11-14 NOTE — NURSING NOTE
1015 Discharge instructions reviewed with pt. Gave her the option of waiting to review until her ride arrived and she decided she would like to receive it now. All questions and concerns addressed. Medications will be brought to her bedside from outpt pharmacy.     1310 Spoke with Ayanna at [facility] regarding the changes of the pts plan of care and medication changes. She is happy with the adjustments made. Pt's IV removed.

## 2023-11-14 NOTE — DISCHARGE SUMMARY
Discharge Diagnosis  Ventral incisional hernia    Issues Requiring Follow-Up  Drain removal, post-operative check, surgical incision check    Test Results Pending At Discharge  Pending Labs       No current pending labs.            Hospital Course  Patient is an 83 year old female who is POD 7 from ventral hernia repair.  There were no intra-operative complications and she was discharged to PACU in stable condition.  She was then admitted to the floor.  Her pain is well controlled, she is voiding spontaneously.  Tolerating PO intake.  Planning for discharge home today, scripts sent to patient's pharmacy.      Pertinent Physical Exam At Time of Discharge  Physical Exam  HENT:      Head: Normocephalic and atraumatic.   Eyes:      Extraocular Movements: Extraocular movements intact.      Conjunctiva/sclera: Conjunctivae normal.      Pupils: Pupils are equal, round, and reactive to light.   Cardiovascular:      Rate and Rhythm: Normal rate and regular rhythm.      Pulses: Normal pulses.   Pulmonary:      Breath sounds: Normal breath sounds.   Abdominal:      General: Bowel sounds are normal.      Palpations: Abdomen is soft.      Comments: LLQ ostomy pink beefy with liquid brown stool  Midline ABD incision with edges approximated  TAMMY drain to bulb suction with SS output   Skin:     General: Skin is warm and dry.      Capillary Refill: Capillary refill takes less than 2 seconds.   Neurological:      General: No focal deficit present.      Mental Status: She is alert and oriented to person, place, and time.         Home Medications     Medication List      START taking these medications     acetaminophen 500 mg tablet; Commonly known as: Tylenol; Take 2 tablets   (1,000 mg) by mouth every 8 hours if needed for mild pain (1 - 3).   traMADol 50 mg tablet; Commonly known as: Ultram; Take 1 tablet (50 mg)   by mouth every 6 hours if needed for severe pain (7 - 10) for up to 3   days.     CONTINUE taking these medications      atorvastatin 10 mg tablet; Commonly known as: Lipitor   furosemide 20 mg tablet; Commonly known as: Lasix   losartan 50 mg tablet; Commonly known as: Cozaar   magnesium oxide 400 mg tablet; Commonly known as: Mag-Ox   melatonin 1 mg tablet, sublingual   multivitamin tablet   One-Per-Day Omega-3 684-1,200 mg capsule; Generic drug: omega-3 fatty   acids-fish oil   potassium chloride CR 10 mEq ER tablet; Commonly known as: Klor-Con   psyllium 3.4 gram packet; Commonly known as: Metamucil       Outpatient Follow-Up  Future Appointments   Date Time Provider Department Center   11/30/2023  2:15 PM Ambar Guzman MD [id number]       Maria Del Rosario Cook, APRN-CNP

## 2023-11-14 NOTE — PROGRESS NOTES
Sunita Luther is a 83 y.o. female on day 5 of admission presenting with Ventral incisional hernia.      Subjective   Tolerating a soft diet    Objective     Last Recorded Vitals  /88   Pulse 74   Temp 36.4 °C (97.5 °F) (Temporal)   Resp 17   Wt 81 kg (178 lb 9.2 oz)   SpO2 93%   Intake/Output last 3 Shifts:    Intake/Output Summary (Last 24 hours) at 11/14/2023 1224  Last data filed at 11/14/2023 1109  Gross per 24 hour   Intake 1200 ml   Output 100 ml   Net 1100 ml         Admission Weight  Weight: 81 kg (178 lb 9.2 oz) (11/07/23 1046)    Daily Weight  11/09/23 : 81 kg (178 lb 9.2 oz)    Image Results  XR chest 1 view  Narrative: Interpreted By:  Leonarda Amador,   STUDY:  XR CHEST 1 VIEW;      INDICATION:  Signs/Symptoms:chest pain.      COMPARISON:  07/01/2018 and abdomen radiograph dated 11/07/2023      ACCESSION NUMBER(S):  [id number]      ORDERING CLINICIAN:  SEBASTIAN YUSUF      FINDINGS:  There is elevation of the right hemidiaphragm, slightly increased on  today's radiograph compared to prior radiograph, however is similar  compared to immediate prior abdomen radiograph dated 11/07/2023.  Cardiac silhouette is normal in size. Bilateral lungs are  hypoinflated. There is ill-defined bandlike airspace opacities in  bilateral lung bases, more pronounced in the left lung base compared  to the right which could represent atelectasis versus airspace  infiltrate. Bilateral lungs demonstrate prominence of coarse  interstitial markings. Dense atherosclerotic calcifications of the  aortic knob are noted. Nasogastric tube is noted extending through  mediastinum and into the abdomen with its tip not included in the  field of view. No large pneumothorax. Obliteration of the left  costophrenic angle could be related to positioning, however small  left pleural effusion can not be excluded. Acute osseous findings.      Impression: 1. Bibasilar airspace opacities, more pronounced in the left lung  compared to the  right could represent atelectasis, however airspace  infiltrate secondary to atypical infectious etiology can also be  considered in the differential in appropriate clinical setting.  2. Mild prominence of interstitial markings in bilateral lungs could  be related to combination of bronchovascular crowding secondary to  hypoinflated lungs versus mild interstitial edema. Recommend  correlation with fluid status.  3. Elevation of the right diaphragmatic dome as described above.          Signed by: Leonarda Amador 11/9/2023 4:22 PM  Dictation workstation:   [id number]      Physical Exam  Gen: lying comfortably in bed, not in acute distress  HEENT: atraumatic, normocephalic  Pulm: normal respiratory effort, clear to auscultation b/l  Cardiac: RRR, no murmurs noted, normal S1/S2  GI: Soft, nontender, no bowel sounds appreciated, abdomen dressed  MSK: normal ROM without joint swelling  Extremities: no LE edema, cyanosis  Neuro: AOX3, CN II-XII grossly intact, equal b/l strength, no loss in sensation   Psych: calm and appropriate for situation          Assessment/Plan                  Active Problems:  There are no active Hospital Problems.    Sunita Luther is a 83 y.o. female with a history of stress-induced cardiomyopathy, diverticulosis, hypertension hyperlipidemia is status post ventral hernia repair     Status post ventral hernia and parastomal hernia repair  Tolerating soft diet, to be discharged later today     Chest pain (resolved)  -secondary to MSK pain following surgery  -EKG negative for ACS, CXR negative for acute intracranial process, troponins within normal limits     Atelectasis  -Encourage IS  -O2 weaned off    Hypertension  Pressure continues to spike throughout stay, as the pain improved blood pressure did not  Hydrochlorothiazide 25 mg was added  At discharge losartan 100/hydrochlorothiazide 25 mg daily will be prescribed     Hyperlipidemia  Atorvastatin  continued     DVT ppx  Lovenox       Natanael NAIR  Christopher, DO

## 2023-11-16 DIAGNOSIS — Z98.890 H/O VENTRAL HERNIA REPAIR: Primary | ICD-10-CM

## 2023-11-16 DIAGNOSIS — Z87.19 H/O VENTRAL HERNIA REPAIR: Primary | ICD-10-CM

## 2023-11-16 RX ORDER — TRAMADOL HYDROCHLORIDE 50 MG/1
50 TABLET ORAL EVERY 8 HOURS PRN
Qty: 10 TABLET | Refills: 0 | Status: SHIPPED | OUTPATIENT
Start: 2023-11-16 | End: 2023-11-28 | Stop reason: ALTCHOICE

## 2023-11-16 NOTE — SIGNIFICANT EVENT
Follow Up Phone Call    Outgoing phone call    Spoke to: Sunita Luther Relationship:self   Phone number: [phone]      Outcome: I left a message on answering machine   No chief complaint on file.         Diagnosis:Not applicable

## 2023-11-20 ENCOUNTER — OFFICE VISIT (OUTPATIENT)
Dept: SURGERY | Facility: CLINIC | Age: 83
End: 2023-11-20
Payer: MEDICARE

## 2023-11-20 VITALS
WEIGHT: 172 LBS | OXYGEN SATURATION: 95 % | TEMPERATURE: 98 F | SYSTOLIC BLOOD PRESSURE: 166 MMHG | BODY MASS INDEX: 30.48 KG/M2 | HEIGHT: 63 IN | DIASTOLIC BLOOD PRESSURE: 70 MMHG | HEART RATE: 80 BPM

## 2023-11-20 DIAGNOSIS — K43.5 PARASTOMAL HERNIA WITHOUT OBSTRUCTION OR GANGRENE: Primary | ICD-10-CM

## 2023-11-20 PROCEDURE — 99024 POSTOP FOLLOW-UP VISIT: CPT | Performed by: SURGERY

## 2023-11-20 PROCEDURE — 1160F RVW MEDS BY RX/DR IN RCRD: CPT | Performed by: SURGERY

## 2023-11-20 PROCEDURE — 1126F AMNT PAIN NOTED NONE PRSNT: CPT | Performed by: SURGERY

## 2023-11-20 PROCEDURE — 1111F DSCHRG MED/CURRENT MED MERGE: CPT | Performed by: SURGERY

## 2023-11-20 PROCEDURE — 1036F TOBACCO NON-USER: CPT | Performed by: SURGERY

## 2023-11-20 PROCEDURE — 3077F SYST BP >= 140 MM HG: CPT | Performed by: SURGERY

## 2023-11-20 PROCEDURE — 3078F DIAST BP <80 MM HG: CPT | Performed by: SURGERY

## 2023-11-20 PROCEDURE — 1159F MED LIST DOCD IN RCRD: CPT | Performed by: SURGERY

## 2023-11-21 NOTE — PROGRESS NOTES
Subjective   Patient ID: Sunita Luther is a 83 y.o. female who presents for Post-op (POV- Ventral hernia repair).  HPIThis patient not only had a midline incisional hernia repair done but also a parastomal repair.  She spent several days in the hospital recovering and is [facility].  She is eating she does not have her normal appetite but she is pleased with the variety of food that she is able to tolerate thus far.  Her colostomy is beginning to function sometimes at solid many times however it is a liquid.  She is attempting to use Metamucil to bulk that up which is something she is done in the past.  She is also walking about and carrying out fairly normal activities but just avoiding lifting.  She also also is using her binder day and night for extra support.    Review of Systems    Objective   Physical Exam  The incision is clean and dry the drain had little out of it therefore that was removed today and the colostomy is pink and functioning well.  Assessment/Plan Status post parastomal hernia repair and ventral incisional hernia repair with a underlay of Stratus mesh in a preperitoneal position.I have asked the patient to follow-up with me in 1 to 2 weeks

## 2023-11-27 ENCOUNTER — APPOINTMENT (OUTPATIENT)
Dept: SURGERY | Facility: CLINIC | Age: 83
End: 2023-11-27
Payer: MEDICARE

## 2023-11-28 ENCOUNTER — OFFICE VISIT (OUTPATIENT)
Dept: SURGERY | Facility: CLINIC | Age: 83
End: 2023-11-28
Payer: MEDICARE

## 2023-11-28 VITALS
SYSTOLIC BLOOD PRESSURE: 147 MMHG | TEMPERATURE: 97.3 F | BODY MASS INDEX: 30.72 KG/M2 | HEART RATE: 69 BPM | WEIGHT: 173.4 LBS | HEIGHT: 63 IN | OXYGEN SATURATION: 95 % | RESPIRATION RATE: 16 BRPM | DIASTOLIC BLOOD PRESSURE: 77 MMHG

## 2023-11-28 DIAGNOSIS — Z87.19 HISTORY OF INCISIONAL HERNIA REPAIR: Primary | ICD-10-CM

## 2023-11-28 DIAGNOSIS — Z98.890 HISTORY OF INCISIONAL HERNIA REPAIR: Primary | ICD-10-CM

## 2023-11-28 PROCEDURE — 3078F DIAST BP <80 MM HG: CPT | Performed by: SURGERY

## 2023-11-28 PROCEDURE — 1159F MED LIST DOCD IN RCRD: CPT | Performed by: SURGERY

## 2023-11-28 PROCEDURE — 3077F SYST BP >= 140 MM HG: CPT | Performed by: SURGERY

## 2023-11-28 PROCEDURE — 1036F TOBACCO NON-USER: CPT | Performed by: SURGERY

## 2023-11-28 PROCEDURE — 1111F DSCHRG MED/CURRENT MED MERGE: CPT | Performed by: SURGERY

## 2023-11-28 PROCEDURE — 99024 POSTOP FOLLOW-UP VISIT: CPT | Performed by: SURGERY

## 2023-11-28 PROCEDURE — 1160F RVW MEDS BY RX/DR IN RCRD: CPT | Performed by: SURGERY

## 2023-11-28 PROCEDURE — 1126F AMNT PAIN NOTED NONE PRSNT: CPT | Performed by: SURGERY

## 2023-11-28 NOTE — PROGRESS NOTES
Subjective   Patient ID: Sunita Luther is a 83 y.o. female who presents for Post-op (POV Ventral and Parastomal Hernia Repair).  HPI  This is a patient who underwent an abdominal wall reconstruction with placement of a Stratus mesh several weeks ago.  She spent several days in the hospital and is now recovering.  Review of Systems she walked in today without a walker.  She states she is moving around more and feels more steady on her feet.  She is eating and has expanded her variety of foods a bit.  She has some minimal discomfort at night.  She has not even taken any Tylenol for a week.  Her colostomy is functioning normally.  She does take Metamucil daily.  10 point review of systems is otherwise negative.    Objective   Physical Exam the incision is clean and dry there are no signs of infection there is 1 area right around the umbilicus where there is some eschar and some inflammation around this but no drainage at the moment.  The repair seems to be intact.    Assessment/Plan status post ventral incisional and parastomal hernia repair.  Doing well.  Follow-up as needed.

## 2023-11-30 ENCOUNTER — APPOINTMENT (OUTPATIENT)
Dept: SURGERY | Facility: CLINIC | Age: 83
End: 2023-11-30
Payer: MEDICARE

## 2024-01-23 ENCOUNTER — LAB REQUISITION (OUTPATIENT)
Dept: LAB | Facility: HOSPITAL | Age: 84
End: 2024-01-23
Payer: MEDICARE

## 2024-01-23 DIAGNOSIS — E78.2 MIXED HYPERLIPIDEMIA: ICD-10-CM

## 2024-01-23 DIAGNOSIS — E55.9 VITAMIN D DEFICIENCY, UNSPECIFIED: ICD-10-CM

## 2024-01-23 DIAGNOSIS — E53.8 DEFICIENCY OF OTHER SPECIFIED B GROUP VITAMINS: ICD-10-CM

## 2024-01-23 DIAGNOSIS — D64.9 ANEMIA, UNSPECIFIED: ICD-10-CM

## 2024-01-23 DIAGNOSIS — E10.9 TYPE 1 DIABETES MELLITUS WITHOUT COMPLICATIONS (MULTI): ICD-10-CM

## 2024-01-23 DIAGNOSIS — I10 ESSENTIAL (PRIMARY) HYPERTENSION: ICD-10-CM

## 2024-01-23 LAB
ALBUMIN SERPL BCP-MCNC: 4.2 G/DL (ref 3.4–5)
ALP SERPL-CCNC: 66 U/L (ref 33–136)
ALT SERPL W P-5'-P-CCNC: 15 U/L (ref 7–45)
ANION GAP SERPL CALC-SCNC: 14 MMOL/L (ref 10–20)
AST SERPL W P-5'-P-CCNC: 17 U/L (ref 9–39)
BASOPHILS # BLD AUTO: 0.08 X10*3/UL (ref 0–0.1)
BASOPHILS NFR BLD AUTO: 1.3 %
BILIRUB SERPL-MCNC: 0.6 MG/DL (ref 0–1.2)
BUN SERPL-MCNC: 16 MG/DL (ref 6–23)
CALCIUM SERPL-MCNC: 9.7 MG/DL (ref 8.6–10.3)
CHLORIDE SERPL-SCNC: 102 MMOL/L (ref 98–107)
CHOLEST SERPL-MCNC: 152 MG/DL (ref 0–199)
CHOLESTEROL/HDL RATIO: 2.5
CO2 SERPL-SCNC: 27 MMOL/L (ref 21–32)
CREAT SERPL-MCNC: 0.52 MG/DL (ref 0.5–1.05)
EGFRCR SERPLBLD CKD-EPI 2021: >90 ML/MIN/1.73M*2
EOSINOPHIL # BLD AUTO: 0.24 X10*3/UL (ref 0–0.4)
EOSINOPHIL NFR BLD AUTO: 3.8 %
ERYTHROCYTE [DISTWIDTH] IN BLOOD BY AUTOMATED COUNT: 13.3 % (ref 11.5–14.5)
GLUCOSE SERPL-MCNC: 97 MG/DL (ref 74–99)
HCT VFR BLD AUTO: 43 % (ref 36–46)
HDLC SERPL-MCNC: 61.3 MG/DL
HGB BLD-MCNC: 14.1 G/DL (ref 12–16)
IMM GRANULOCYTES # BLD AUTO: 0.02 X10*3/UL (ref 0–0.5)
IMM GRANULOCYTES NFR BLD AUTO: 0.3 % (ref 0–0.9)
LDLC SERPL CALC-MCNC: 68 MG/DL
LYMPHOCYTES # BLD AUTO: 1.79 X10*3/UL (ref 0.8–3)
LYMPHOCYTES NFR BLD AUTO: 28 %
MCH RBC QN AUTO: 30.3 PG (ref 26–34)
MCHC RBC AUTO-ENTMCNC: 32.8 G/DL (ref 32–36)
MCV RBC AUTO: 93 FL (ref 80–100)
MONOCYTES # BLD AUTO: 0.59 X10*3/UL (ref 0.05–0.8)
MONOCYTES NFR BLD AUTO: 9.2 %
NEUTROPHILS # BLD AUTO: 3.67 X10*3/UL (ref 1.6–5.5)
NEUTROPHILS NFR BLD AUTO: 57.4 %
NON HDL CHOLESTEROL: 91 MG/DL (ref 0–149)
NRBC BLD-RTO: 0 /100 WBCS (ref 0–0)
PLATELET # BLD AUTO: 246 X10*3/UL (ref 150–450)
POTASSIUM SERPL-SCNC: 5.1 MMOL/L (ref 3.5–5.3)
PROT SERPL-MCNC: 6.4 G/DL (ref 6.4–8.2)
RBC # BLD AUTO: 4.65 X10*6/UL (ref 4–5.2)
SODIUM SERPL-SCNC: 138 MMOL/L (ref 136–145)
TRIGL SERPL-MCNC: 114 MG/DL (ref 0–149)
VLDL: 23 MG/DL (ref 0–40)
WBC # BLD AUTO: 6.4 X10*3/UL (ref 4.4–11.3)

## 2024-01-23 PROCEDURE — 80061 LIPID PANEL: CPT | Mod: OUT | Performed by: INTERNAL MEDICINE

## 2024-01-23 PROCEDURE — 83036 HEMOGLOBIN GLYCOSYLATED A1C: CPT | Mod: OUT,GEALAB | Performed by: INTERNAL MEDICINE

## 2024-01-23 PROCEDURE — 80053 COMPREHEN METABOLIC PANEL: CPT | Mod: OUT | Performed by: INTERNAL MEDICINE

## 2024-01-23 PROCEDURE — 82607 VITAMIN B-12: CPT | Mod: OUT,GEALAB | Performed by: INTERNAL MEDICINE

## 2024-01-23 PROCEDURE — 85025 COMPLETE CBC W/AUTO DIFF WBC: CPT | Mod: OUT | Performed by: INTERNAL MEDICINE

## 2024-01-23 PROCEDURE — 82306 VITAMIN D 25 HYDROXY: CPT | Mod: OUT,GEALAB | Performed by: INTERNAL MEDICINE

## 2024-01-24 LAB
25(OH)D3 SERPL-MCNC: 49 NG/ML (ref 30–100)
EST. AVERAGE GLUCOSE BLD GHB EST-MCNC: 114 MG/DL
HBA1C MFR BLD: 5.6 %
VIT B12 SERPL-MCNC: 700 PG/ML (ref 211–911)

## 2024-02-18 LAB
ATRIAL RATE: 75 BPM
P AXIS: -10 DEGREES
P OFFSET: 190 MS
P ONSET: 142 MS
PR INTERVAL: 160 MS
Q ONSET: 222 MS
QRS COUNT: 12 BEATS
QRS DURATION: 74 MS
QT INTERVAL: 376 MS
QTC CALCULATION(BAZETT): 419 MS
QTC FREDERICIA: 404 MS
R AXIS: 11 DEGREES
T AXIS: 62 DEGREES
T OFFSET: 410 MS
VENTRICULAR RATE: 75 BPM

## 2024-03-21 ENCOUNTER — LAB REQUISITION (OUTPATIENT)
Dept: LAB | Facility: HOSPITAL | Age: 84
End: 2024-03-21
Payer: MEDICARE

## 2024-03-21 DIAGNOSIS — E83.40 DISORDERS OF MAGNESIUM METABOLISM, UNSPECIFIED: ICD-10-CM

## 2024-03-21 DIAGNOSIS — E78.2 MIXED HYPERLIPIDEMIA: ICD-10-CM

## 2024-03-21 LAB
CHOLEST SERPL-MCNC: 159 MG/DL (ref 0–199)
CHOLESTEROL/HDL RATIO: 2.8
HDLC SERPL-MCNC: 57.1 MG/DL
LDLC SERPL CALC-MCNC: 81 MG/DL
MAGNESIUM SERPL-MCNC: 1.96 MG/DL (ref 1.6–2.4)
NON HDL CHOLESTEROL: 102 MG/DL (ref 0–149)
TRIGL SERPL-MCNC: 104 MG/DL (ref 0–149)
VLDL: 21 MG/DL (ref 0–40)

## 2024-03-21 PROCEDURE — 83735 ASSAY OF MAGNESIUM: CPT | Mod: OUT | Performed by: REGISTERED NURSE

## 2024-03-21 PROCEDURE — 80061 LIPID PANEL: CPT | Mod: OUT | Performed by: REGISTERED NURSE

## 2024-04-11 ENCOUNTER — LAB REQUISITION (OUTPATIENT)
Dept: LAB | Facility: HOSPITAL | Age: 84
End: 2024-04-11
Payer: MEDICARE

## 2024-04-11 DIAGNOSIS — E78.2 MIXED HYPERLIPIDEMIA: ICD-10-CM

## 2024-04-11 LAB
CHOLEST SERPL-MCNC: 179 MG/DL (ref 0–199)
CHOLESTEROL/HDL RATIO: 3.2
HDLC SERPL-MCNC: 55.3 MG/DL
LDLC SERPL CALC-MCNC: 99 MG/DL
NON HDL CHOLESTEROL: 124 MG/DL (ref 0–149)
TRIGL SERPL-MCNC: 123 MG/DL (ref 0–149)
VLDL: 25 MG/DL (ref 0–40)

## 2024-04-11 PROCEDURE — 80061 LIPID PANEL: CPT | Mod: OUT | Performed by: REGISTERED NURSE

## 2024-04-29 ENCOUNTER — OFFICE VISIT (OUTPATIENT)
Dept: CARDIOLOGY | Facility: HOSPITAL | Age: 84
End: 2024-04-29
Payer: MEDICARE

## 2024-04-29 VITALS
BODY MASS INDEX: 31.91 KG/M2 | HEART RATE: 60 BPM | OXYGEN SATURATION: 95 % | WEIGHT: 180.12 LBS | SYSTOLIC BLOOD PRESSURE: 157 MMHG | DIASTOLIC BLOOD PRESSURE: 79 MMHG

## 2024-04-29 DIAGNOSIS — E78.5 HYPERLIPIDEMIA, UNSPECIFIED HYPERLIPIDEMIA TYPE: Primary | ICD-10-CM

## 2024-04-29 DIAGNOSIS — I10 BENIGN ESSENTIAL HYPERTENSION: ICD-10-CM

## 2024-04-29 PROCEDURE — 3077F SYST BP >= 140 MM HG: CPT | Performed by: NURSE PRACTITIONER

## 2024-04-29 PROCEDURE — 3078F DIAST BP <80 MM HG: CPT | Performed by: NURSE PRACTITIONER

## 2024-04-29 PROCEDURE — 99213 OFFICE O/P EST LOW 20 MIN: CPT | Performed by: NURSE PRACTITIONER

## 2024-04-29 PROCEDURE — 1157F ADVNC CARE PLAN IN RCRD: CPT | Performed by: NURSE PRACTITIONER

## 2024-04-29 PROCEDURE — 1036F TOBACCO NON-USER: CPT | Performed by: NURSE PRACTITIONER

## 2024-04-29 PROCEDURE — 1159F MED LIST DOCD IN RCRD: CPT | Performed by: NURSE PRACTITIONER

## 2024-04-29 ASSESSMENT — ENCOUNTER SYMPTOMS
DEPRESSION: 0
PSYCHIATRIC NEGATIVE: 1
EYES NEGATIVE: 1
RESPIRATORY NEGATIVE: 1
ABDOMINAL PAIN: 1
CONSTITUTIONAL NEGATIVE: 1
ENDOCRINE NEGATIVE: 1
NEUROLOGICAL NEGATIVE: 1
CARDIOVASCULAR NEGATIVE: 1
VOMITING: 1

## 2024-04-29 NOTE — PROGRESS NOTES
Referred by Dr. Angelique phillips. provider found for Follow-up (6 mth.)     History Of Present Illness:    Sunita Luther is a very pleasant 84 year old female with a stressed induced cardiomyopathy, LVEF has recovered and HTN, she is here for a follow up visit. The patient is seen in collaboration with Dr. Huynh. Sister Homa states her blood pressure has been controlled. She has noticed increased swelling in her legs.  She denies chest pain or shortness of breath. Occasional lightheadedness.      Review of Systems   Constitutional: Negative.   HENT: Negative.     Eyes: Negative.    Cardiovascular: Negative.    Respiratory: Negative.     Endocrine: Negative.    Skin: Negative.    Musculoskeletal:  Positive for arthritis and muscle weakness.   Gastrointestinal:  Positive for abdominal pain and vomiting.   Neurological: Negative.    Psychiatric/Behavioral: Negative.          Past Medical History:  She has a past medical history of Cardiomyopathy (Multi), Class 1 obesity with body mass index (BMI) of 32.0 to 32.9 in adult, Colovaginal fistula, Diverticulosis, Encounter for pre-operative cardiovascular clearance (10/23/2023), Lumbar disc displacement without myelopathy, Lumbar radiculitis, Lumbar spondylosis, Osteoarthritis of right hip, Osteoporosis, Parastomal hernia without obstruction or gangrene, Paroxysmal atrial fibrillation (Multi), Personal history of other diseases of the circulatory system (07/18/2014), and Postmenopausal bleeding.    Past Surgical History:  She has a past surgical history that includes Hemorrhoid surgery (02/24/2014); Varicose vein surgery (02/24/2014); Breast lumpectomy (02/24/2014); Hysterectomy (10/27/2015); and Other surgical history (12/18/2015).      Social History:  She reports that she has never smoked. She has never been exposed to tobacco smoke. She has never used smokeless tobacco. She reports that she does not drink alcohol and does not use drugs.    Family History:  Family History    Problem Relation Name Age of Onset    Hypertension Mother      Heart attack Mother      Stroke Father      Lung cancer Father          Allergies:  Hydrocodone-acetaminophen, Oxycodone, and Bactrim [sulfamethoxazole-trimethoprim]    Outpatient Medications:  Current Outpatient Medications   Medication Instructions    acetaminophen (TYLENOL) 1,000 mg, oral, Every 8 hours PRN    atorvastatin (LIPITOR) 10 mg, oral, Daily    losartan-hydrochlorothiazide (Hyzaar) 100-25 mg tablet 1 tablet, oral, Daily    magnesium oxide (Mag-Ox) 400 mg tablet Magnesium 400 MG Oral Tablet   Refills: 0        Start : 13-May-2019   Active    melatonin 1 mg, Mouth/Throat, Daily    multivitamin tablet Multi-Vitamin TABS   Refills: 0       Active    omega-3 fatty acids-fish oil (One-Per-Day Omega-3) 684-1,200 mg capsule Omega 3 CAPS   Refills: 0       Active    psyllium (Metamucil) 3.4 gram packet 1 packet, oral, Daily        Last Recorded Vitals:  Vitals:    04/29/24 1418   BP: 157/79   Pulse: 60   SpO2: 95%   Weight: 81.7 kg (180 lb 1.9 oz)       Physical Exam:  Physical Exam  Vitals reviewed.   HENT:      Head: Normocephalic.      Nose: Nose normal.   Eyes:      Pupils: Pupils are equal, round, and reactive to light.   Cardiovascular:      Rate and Rhythm: Normal rate and regular rhythm.   Pulmonary:      Effort: Pulmonary effort is normal.      Breath sounds: Normal breath sounds.   Abdominal:      General: Abdomen is flat.      Palpations: Abdomen is soft.   Musculoskeletal:         General: Normal range of motion.      Cervical back: Normal range of motion.   Skin:     General: Skin is warm and dry.   Neurological:      General: No focal deficit present.      Mental Status: He is alert and oriented to person, place, and time.   Psychiatric:         Mood and Affect: Mood normal.   Extremities +1 pedal edema bilaterally            Last Labs:  CBC -  Lab Results   Component Value Date    WBC 6.4 01/23/2024    HGB 14.1 01/23/2024    HCT 43.0  01/23/2024    MCV 93 01/23/2024     01/23/2024       CMP -  Lab Results   Component Value Date    CALCIUM 9.7 01/23/2024    PHOS 4.0 11/08/2023    PROT 6.4 01/23/2024    ALBUMIN 4.2 01/23/2024    AST 17 01/23/2024    ALT 15 01/23/2024    ALKPHOS 66 01/23/2024    BILITOT 0.6 01/23/2024       LIPID PANEL -   Lab Results   Component Value Date    CHOL 179 04/11/2024    TRIG 123 04/11/2024    HDL 55.3 04/11/2024    CHHDL 3.2 04/11/2024    LDLF 68 09/12/2023    VLDL 25 04/11/2024    NHDL 124 04/11/2024       RENAL FUNCTION PANEL -   Lab Results   Component Value Date    GLUCOSE 97 01/23/2024     01/23/2024    K 5.1 01/23/2024     01/23/2024    CO2 27 01/23/2024    ANIONGAP 14 01/23/2024    BUN 16 01/23/2024    CREATININE 0.52 01/23/2024    CALCIUM 9.7 01/23/2024    PHOS 4.0 11/08/2023    ALBUMIN 4.2 01/23/2024        Lab Results   Component Value Date    BNP 66 04/19/2022    HGBA1C 5.6 01/23/2024       Last Cardiology Tests:  ECG:      Echo:  Echocardiogram 12/2022  1. Left ventricular systolic function is normal with a 60-65% estimated ejection fraction.   2. Spectral Doppler shows an impaired relaxation pattern of left ventricular diastolic filling.   3. Mild to moderate aortic valve regurgitation.   4. There is mild mitral and tricuspid regurgitation.  Echocardiogram 10/2018   1. The left ventricular systolic function is normal with a 60-65% estimated ejection fraction.   2. Spectral Doppler shows an impaired relaxation pattern of left ventricular diastolic filling.   3. Concentric left ventricular hypertrophy.     Echocardiogram 7/2018   1. The left ventricular systolic function is moderately decreased with a 35% estimated ejection fraction.   2. Entire apex is abnormal.   3. Spectral Doppler shows an impaired relaxation pattern of left ventricular diastolic filling.   4. There is moderate aortic valve regurgitation.   5. There is mild mitral, tricuspid, and pulmonic regurgitation.   6. The  estimated pulmonary artery pressure is mildly elevated with the RVSP at 42.7 mmHg.   7. There is a trivial pericardial effusion.    Echocardiogram 6/2018  1. The left ventricular systolic function is normal with a 55-60% estimated ejection fraction.   2. Spectral Doppler shows an impaired relaxation pattern of left ventricular diastolic filling.   3. There is mild to moderate aortic valve regurgitation.     Ejection Fractions:  Lvef 60-65%  Cath:  [facility] 6/1/2018   1. Minimal nonobstructive CAD in a right dominant circulation.   2. No significant LV-AO peak to peak pullback gradient.   3. Left Ventricular end-diastolic pressure = 27.      Stress Test:    Cardiac Imaging:      Assessment/Plan   Very pleasant 84 year old female with a past medical history of stress induced cardiomyopathy with recovered LVEF, [facility] 2018 with non obstructive disease, hypertension, colostomy 2016, she continues to do well. She has swelling in her legs today. We did discuss starting the Lasix, se declined at this time. Heart rate and blood pressure is well controlled today.       Plan:  Call with any questions   Continue  Atorvastatin and Losartan-hydrochlorothiazide    Follow up in one year       Stephanie Ivan, APRN-CNP

## 2024-09-17 ENCOUNTER — LAB REQUISITION (OUTPATIENT)
Dept: LAB | Facility: HOSPITAL | Age: 84
End: 2024-09-17
Payer: MEDICARE

## 2024-09-17 DIAGNOSIS — D64.9 ANEMIA, UNSPECIFIED: ICD-10-CM

## 2024-09-17 DIAGNOSIS — I10 ESSENTIAL (PRIMARY) HYPERTENSION: ICD-10-CM

## 2024-09-17 LAB
ANION GAP SERPL CALC-SCNC: 15 MMOL/L (ref 10–20)
BUN SERPL-MCNC: 15 MG/DL (ref 6–23)
CALCIUM SERPL-MCNC: 9.3 MG/DL (ref 8.6–10.3)
CHLORIDE SERPL-SCNC: 102 MMOL/L (ref 98–107)
CO2 SERPL-SCNC: 26 MMOL/L (ref 21–32)
CREAT SERPL-MCNC: 0.52 MG/DL (ref 0.5–1.05)
EGFRCR SERPLBLD CKD-EPI 2021: >90 ML/MIN/1.73M*2
ERYTHROCYTE [DISTWIDTH] IN BLOOD BY AUTOMATED COUNT: 13.1 % (ref 11.5–14.5)
GLUCOSE SERPL-MCNC: 97 MG/DL (ref 74–99)
HCT VFR BLD AUTO: 41.2 % (ref 36–46)
HGB BLD-MCNC: 13.8 G/DL (ref 12–16)
MCH RBC QN AUTO: 30.9 PG (ref 26–34)
MCHC RBC AUTO-ENTMCNC: 33.5 G/DL (ref 32–36)
MCV RBC AUTO: 92 FL (ref 80–100)
NRBC BLD-RTO: 0 /100 WBCS (ref 0–0)
PLATELET # BLD AUTO: 231 X10*3/UL (ref 150–450)
POTASSIUM SERPL-SCNC: 4.8 MMOL/L (ref 3.5–5.3)
RBC # BLD AUTO: 4.47 X10*6/UL (ref 4–5.2)
SODIUM SERPL-SCNC: 138 MMOL/L (ref 136–145)
WBC # BLD AUTO: 6.9 X10*3/UL (ref 4.4–11.3)

## 2024-09-17 PROCEDURE — 80048 BASIC METABOLIC PNL TOTAL CA: CPT | Mod: OUT | Performed by: REGISTERED NURSE

## 2024-09-17 PROCEDURE — 85027 COMPLETE CBC AUTOMATED: CPT | Mod: OUT | Performed by: REGISTERED NURSE

## 2024-09-25 ENCOUNTER — HOSPITAL ENCOUNTER (OUTPATIENT)
Dept: RADIOLOGY | Facility: HOSPITAL | Age: 84
Discharge: HOME | End: 2024-09-25
Payer: MEDICARE

## 2024-09-25 DIAGNOSIS — M17.12 UNILATERAL PRIMARY OSTEOARTHRITIS, LEFT KNEE: ICD-10-CM

## 2024-09-25 DIAGNOSIS — M25.562 PAIN IN LEFT KNEE: ICD-10-CM

## 2024-09-25 PROCEDURE — 73700 CT LOWER EXTREMITY W/O DYE: CPT | Mod: LEFT SIDE | Performed by: STUDENT IN AN ORGANIZED HEALTH CARE EDUCATION/TRAINING PROGRAM

## 2024-09-25 PROCEDURE — 73700 CT LOWER EXTREMITY W/O DYE: CPT | Mod: LT

## 2024-10-01 ENCOUNTER — OFFICE VISIT (OUTPATIENT)
Dept: CARDIOLOGY | Facility: HOSPITAL | Age: 84
End: 2024-10-01
Payer: MEDICARE

## 2024-10-01 VITALS
HEART RATE: 59 BPM | SYSTOLIC BLOOD PRESSURE: 138 MMHG | BODY MASS INDEX: 32.14 KG/M2 | OXYGEN SATURATION: 96 % | WEIGHT: 181.44 LBS | DIASTOLIC BLOOD PRESSURE: 64 MMHG

## 2024-10-01 DIAGNOSIS — Z01.818 PRE-OPERATIVE CLEARANCE: ICD-10-CM

## 2024-10-01 DIAGNOSIS — I08.0 MITRAL VALVE INSUFFICIENCY AND AORTIC VALVE INSUFFICIENCY: Primary | ICD-10-CM

## 2024-10-01 LAB
ATRIAL RATE: 58 BPM
P AXIS: 29 DEGREES
P OFFSET: 180 MS
P ONSET: 125 MS
PR INTERVAL: 200 MS
Q ONSET: 225 MS
QRS COUNT: 10 BEATS
QRS DURATION: 74 MS
QT INTERVAL: 426 MS
QTC CALCULATION(BAZETT): 418 MS
QTC FREDERICIA: 421 MS
R AXIS: 2 DEGREES
T AXIS: 97 DEGREES
T OFFSET: 438 MS
VENTRICULAR RATE: 58 BPM

## 2024-10-01 PROCEDURE — 3075F SYST BP GE 130 - 139MM HG: CPT | Performed by: NURSE PRACTITIONER

## 2024-10-01 PROCEDURE — 1159F MED LIST DOCD IN RCRD: CPT | Performed by: NURSE PRACTITIONER

## 2024-10-01 PROCEDURE — 3078F DIAST BP <80 MM HG: CPT | Performed by: NURSE PRACTITIONER

## 2024-10-01 PROCEDURE — 99214 OFFICE O/P EST MOD 30 MIN: CPT | Performed by: NURSE PRACTITIONER

## 2024-10-01 PROCEDURE — 81003 URINALYSIS AUTO W/O SCOPE: CPT | Mod: OUT | Performed by: REGISTERED NURSE

## 2024-10-01 PROCEDURE — 93005 ELECTROCARDIOGRAM TRACING: CPT | Performed by: NURSE PRACTITIONER

## 2024-10-01 PROCEDURE — 1157F ADVNC CARE PLAN IN RCRD: CPT | Performed by: NURSE PRACTITIONER

## 2024-10-01 PROCEDURE — 1036F TOBACCO NON-USER: CPT | Performed by: NURSE PRACTITIONER

## 2024-10-01 PROCEDURE — 87086 URINE CULTURE/COLONY COUNT: CPT | Mod: OUT,GEALAB | Performed by: REGISTERED NURSE

## 2024-10-01 RX ORDER — ASPIRIN 81 MG/1
81 TABLET ORAL DAILY
COMMUNITY

## 2024-10-01 NOTE — PROGRESS NOTES
Chief Complaint:   Pre-operative cardiac evaluation      History Of Present Illness:    Sunita Luther is a 84 y.o. female sister from [place] with h/o stress induced cardiomyopathy 7/2018 with recovered EF 60% ([facility] 7/1/2018 non-obstructive CAD), htn, osteoarthritis presenting today for pre-operative cardiac evaluation prior to upcoming elective left knee replacement with Dr. Fajardo scheduled for next week.  She denies symptoms of chest pain, SOB, or palpitations.  She does have chronic LE edema that has remained unchanged for many years-- improves at night with elevation of her legs.  Takes losartan-hydrochlorothiazide-- splits table in half and takes half table BID as this reduces lightheadedness she had with full tablet.      Last Recorded Vitals:  Vitals:    10/01/24 1456 10/01/24 1527   BP: 177/71 138/64   Pulse: 59    SpO2: 96%    Weight: 82.3 kg (181 lb 7 oz)        Past Medical History:  She has a past medical history of Cardiomyopathy, Class 1 obesity with body mass index (BMI) of 32.0 to 32.9 in adult, Colovaginal fistula, Diverticulosis, Encounter for pre-operative cardiovascular clearance (10/23/2023), Lumbar disc displacement without myelopathy, Lumbar radiculitis, Lumbar spondylosis, Osteoarthritis of right hip, Osteoporosis, Parastomal hernia without obstruction or gangrene, Paroxysmal atrial fibrillation (Multi), Personal history of other diseases of the circulatory system (07/18/2014), and Postmenopausal bleeding.    Past Surgical History:  She has a past surgical history that includes Hemorrhoid surgery (02/24/2014); Varicose vein surgery (02/24/2014); Breast lumpectomy (02/24/2014); Hysterectomy (10/27/2015); and Other surgical history (12/18/2015).      Social History:  She reports that she has never smoked. She has never been exposed to tobacco smoke. She has never used smokeless tobacco. She reports that she does not drink alcohol and does not use drugs.    Family History:  Family History    Problem Relation Name Age of Onset    Hypertension Mother      Heart attack Mother      Stroke Father      Lung cancer Father          Allergies:  Hydrocodone-acetaminophen, Oxycodone, and Bactrim [sulfamethoxazole-trimethoprim]    Outpatient Medications:  Current Outpatient Medications   Medication Instructions    acetaminophen (TYLENOL) 1,000 mg, oral, Every 8 hours PRN    aspirin 81 mg, oral, Daily    atorvastatin (LIPITOR) 10 mg, oral, Daily    losartan-hydrochlorothiazide (Hyzaar) 100-25 mg tablet 1 tablet, oral, Daily    magnesium oxide (Mag-Ox) 400 mg tablet Magnesium 400 MG Oral Tablet   Refills: 0        Start : 13-May-2019   Active    melatonin 1 mg, Mouth/Throat, Daily    multivitamin tablet Multi-Vitamin TABS   Refills: 0       Active    omega-3 fatty acids-fish oil (One-Per-Day Omega-3) 684-1,200 mg capsule Omega 3 CAPS   Refills: 0       Active    psyllium (Metamucil) 3.4 gram packet 1 packet, oral, Daily       Physical Exam:  Constitutional: Pleasant, Awake/Alert/Oriented to person place and time. No distress  Head: Atraumatic, Normocephalic  Eyes: EOMI. JAYCE  Neck:  No JVD  Cardiovascular: Regular rate and rhythm, S1, S2. 2/6 murmur RUSB   Respiratory: Clear to auscultation bilaterally. No wheezing, rales or rhonchi.   Abdomen: Soft, Nontender. Bowel sounds appreciated  Musculoskeletal: ROM intact. Muscle strength grossly intact upper and lower extremities 5/5.   Neurological: CNII-XII intact. Sensation grossly intact  Extremities: Warm and dry. 1-2+ pitting edema BLE   Psychiatric: Appropriate mood and affect       Last Labs:  CBC -  Lab Results   Component Value Date    WBC 6.9 09/17/2024    HGB 13.8 09/17/2024    HCT 41.2 09/17/2024    MCV 92 09/17/2024     09/17/2024       CMP -  Lab Results   Component Value Date    CALCIUM 9.3 09/17/2024    PHOS 4.0 11/08/2023    PROT 6.4 01/23/2024    ALBUMIN 4.2 01/23/2024    AST 17 01/23/2024    ALT 15 01/23/2024    ALKPHOS 66 01/23/2024    BILITOT  0.6 01/23/2024       LIPID PANEL -   Lab Results   Component Value Date    CHOL 179 04/11/2024    TRIG 123 04/11/2024    HDL 55.3 04/11/2024    CHHDL 3.2 04/11/2024    LDLF 68 09/12/2023    VLDL 25 04/11/2024    NHDL 124 04/11/2024       RENAL FUNCTION PANEL -   Lab Results   Component Value Date    GLUCOSE 97 09/17/2024     09/17/2024    K 4.8 09/17/2024     09/17/2024    CO2 26 09/17/2024    ANIONGAP 15 09/17/2024    BUN 15 09/17/2024    CREATININE 0.52 09/17/2024    CALCIUM 9.3 09/17/2024    PHOS 4.0 11/08/2023    ALBUMIN 4.2 01/23/2024        Lab Results   Component Value Date    BNP 66 04/19/2022    HGBA1C 5.6 01/23/2024       Last Cardiology Tests:  ECG:  ECG today: sinus bradycardia, HR 58bpm    Echo:  12/22/2022  CONCLUSIONS:   1. Left ventricular systolic function is normal with a 60-65% estimated ejection fraction.   2. Spectral Doppler shows an impaired relaxation pattern of left ventricular diastolic filling.   3. Mild to moderate aortic valve regurgitation.   4. There is mild mitral and tricuspid regurgitation.    10/4/2018  CONCLUSIONS:   1. The left ventricular systolic function is normal with a 60-65% estimated ejection fraction.   2. Spectral Doppler shows an impaired relaxation pattern of left ventricular diastolic filling.   3. Concentric left ventricular hypertrophy.    7/2/2018  CONCLUSIONS:   1. The left ventricular systolic function is moderately decreased with a 35% estimated ejection fraction.   2. Entire apex is abnormal.   3. Spectral Doppler shows an impaired relaxation pattern of left ventricular diastolic filling.   4. There is moderate aortic valve regurgitation.   5. There is mild mitral, tricuspid, and pulmonic regurgitation.   6. The estimated pulmonary artery pressure is mildly elevated with the RVSP at 42.7 mmHg.   7. There is a trivial pericardial effusion.     6/21/2018  CONCLUSIONS:   1. The left ventricular systolic function is normal with a 55-60% estimated  ejection fraction.   2. Spectral Doppler shows an impaired relaxation pattern of left ventricular diastolic filling.   3. There is mild to moderate aortic valve regurgitation.     1/27/2016     CONCLUSIONS:   1. The left ventricular systolic function is normal with a 65% ejection fraction.   2. Spectral Doppler shows an impaired relaxation pattern of left ventricular diastolic filling.   3. There may be a mild left ventricular outflow tract obstruction as evidenced by color Doppler (however, measured velocities are normal).   4. The left atrium is normal in size.   5. There is mild to moderate aortic valve regurgitation.    Cath:  7/1/2018  CONCLUSIONS:   1. Minimal nonobstructive CAD in a right dominant circulation.   2. No significant LV-AO peak to peak pullback gradient.   3. Left Ventricular end-diastolic pressure = 27.       Lab review: I have personally reviewed the laboratory result(s)   Diagnostic review: I have personally reviewed the result(s) of the Echocardiogram, [facility]     Assessment/Plan   Very pleasant 84 y.o. female sister from [place] with h/o stress induced cardiomyopathy 7/2018 with recovered EF 60% ([facility] 7/1/2018 non-obstructive CAD), htn, osteoarthritis presenting today for pre-operative cardiac evaluation prior to upcoming elective left knee replacement with Dr. Fajardo scheduled for next week.  She denies symptoms of chest pain, SOB, or palpitations.  She does have chronic LE edema that has remained unchanged for many years-- improves at night with elevation of her legs.     Plan:  -Continue losartan 50-12.5mg BID  -Continue atorvastatin 10mg daily   -Recommend obtaining an echocardiogram in April for surveillance of AI-- results to be discussed with Stephanie [place] NP at pre-scheduled appointment   -May proceed to the OR with mild risk for perioperative cardiovascular event.       URVASHI Kim-CNP

## 2024-10-02 ENCOUNTER — LAB REQUISITION (OUTPATIENT)
Dept: LAB | Facility: HOSPITAL | Age: 84
End: 2024-10-02
Payer: MEDICARE

## 2024-10-02 DIAGNOSIS — N39.0 URINARY TRACT INFECTION, SITE NOT SPECIFIED: ICD-10-CM

## 2024-10-02 LAB
APPEARANCE UR: CLEAR
BILIRUB UR STRIP.AUTO-MCNC: NEGATIVE MG/DL
COLOR UR: NORMAL
GLUCOSE UR STRIP.AUTO-MCNC: NORMAL MG/DL
KETONES UR STRIP.AUTO-MCNC: NEGATIVE MG/DL
LEUKOCYTE ESTERASE UR QL STRIP.AUTO: NEGATIVE
NITRITE UR QL STRIP.AUTO: NEGATIVE
PH UR STRIP.AUTO: 6.5 [PH]
PROT UR STRIP.AUTO-MCNC: NEGATIVE MG/DL
RBC # UR STRIP.AUTO: NEGATIVE /UL
SP GR UR STRIP.AUTO: 1.01
UROBILINOGEN UR STRIP.AUTO-MCNC: NORMAL MG/DL

## 2024-10-03 LAB — BACTERIA UR CULT: NORMAL

## 2024-10-18 ENCOUNTER — LAB REQUISITION (OUTPATIENT)
Dept: LAB | Facility: HOSPITAL | Age: 84
End: 2024-10-18
Payer: MEDICARE

## 2024-10-18 DIAGNOSIS — E87.5 HYPERKALEMIA: ICD-10-CM

## 2024-10-18 LAB — POTASSIUM SERPL-SCNC: 4.2 MMOL/L (ref 3.5–5.3)

## 2024-10-18 PROCEDURE — 84132 ASSAY OF SERUM POTASSIUM: CPT | Mod: OUT | Performed by: REGISTERED NURSE

## 2024-10-24 PROCEDURE — 81003 URINALYSIS AUTO W/O SCOPE: CPT | Mod: OUT | Performed by: REGISTERED NURSE

## 2024-10-24 PROCEDURE — 87186 SC STD MICRODIL/AGAR DIL: CPT | Mod: OUT,GEALAB | Performed by: REGISTERED NURSE

## 2024-10-25 ENCOUNTER — LAB REQUISITION (OUTPATIENT)
Dept: LAB | Facility: HOSPITAL | Age: 84
End: 2024-10-25
Payer: MEDICARE

## 2024-10-25 DIAGNOSIS — N39.0 URINARY TRACT INFECTION, SITE NOT SPECIFIED: ICD-10-CM

## 2024-10-25 LAB
APPEARANCE UR: ABNORMAL
BILIRUB UR STRIP.AUTO-MCNC: NEGATIVE MG/DL
COLOR UR: YELLOW
GLUCOSE UR STRIP.AUTO-MCNC: NORMAL MG/DL
KETONES UR STRIP.AUTO-MCNC: NEGATIVE MG/DL
LEUKOCYTE ESTERASE UR QL STRIP.AUTO: ABNORMAL
NITRITE UR QL STRIP.AUTO: ABNORMAL
PH UR STRIP.AUTO: 7.5 [PH]
PROT UR STRIP.AUTO-MCNC: NEGATIVE MG/DL
RBC # UR STRIP.AUTO: ABNORMAL /UL
RBC #/AREA URNS AUTO: ABNORMAL /HPF
SP GR UR STRIP.AUTO: 1.01
SQUAMOUS #/AREA URNS AUTO: ABNORMAL /HPF
UROBILINOGEN UR STRIP.AUTO-MCNC: NORMAL MG/DL
WBC #/AREA URNS AUTO: >50 /HPF

## 2024-10-27 LAB — BACTERIA UR CULT: ABNORMAL

## 2024-12-30 ENCOUNTER — HOSPITAL ENCOUNTER (OUTPATIENT)
Dept: RADIOLOGY | Facility: HOSPITAL | Age: 84
Discharge: HOME | End: 2024-12-30
Payer: MEDICARE

## 2024-12-30 DIAGNOSIS — M47.817 SPONDYLOSIS WITHOUT MYELOPATHY OR RADICULOPATHY, LUMBOSACRAL REGION: ICD-10-CM

## 2024-12-30 PROCEDURE — 72110 X-RAY EXAM L-2 SPINE 4/>VWS: CPT | Performed by: RADIOLOGY

## 2024-12-30 PROCEDURE — 72110 X-RAY EXAM L-2 SPINE 4/>VWS: CPT

## 2025-02-19 ENCOUNTER — LAB REQUISITION (OUTPATIENT)
Dept: LAB | Facility: HOSPITAL | Age: 85
End: 2025-02-19
Payer: MEDICARE

## 2025-02-19 DIAGNOSIS — E03.9 HYPOTHYROIDISM, UNSPECIFIED: ICD-10-CM

## 2025-02-19 DIAGNOSIS — M15.9 POLYOSTEOARTHRITIS, UNSPECIFIED: ICD-10-CM

## 2025-02-19 DIAGNOSIS — E78.5 HYPERLIPIDEMIA, UNSPECIFIED: ICD-10-CM

## 2025-02-19 DIAGNOSIS — D64.9 ANEMIA, UNSPECIFIED: ICD-10-CM

## 2025-02-19 DIAGNOSIS — E55.9 VITAMIN D DEFICIENCY, UNSPECIFIED: ICD-10-CM

## 2025-02-19 DIAGNOSIS — I10 ESSENTIAL (PRIMARY) HYPERTENSION: ICD-10-CM

## 2025-02-19 LAB
25(OH)D3 SERPL-MCNC: 40 NG/ML (ref 30–100)
ALBUMIN SERPL BCP-MCNC: 4.4 G/DL (ref 3.4–5)
ALP SERPL-CCNC: 73 U/L (ref 33–136)
ALT SERPL W P-5'-P-CCNC: 22 U/L (ref 7–45)
ANION GAP SERPL CALC-SCNC: 12 MMOL/L (ref 10–20)
AST SERPL W P-5'-P-CCNC: 16 U/L (ref 9–39)
BASOPHILS # BLD AUTO: 0.07 X10*3/UL (ref 0–0.1)
BASOPHILS NFR BLD AUTO: 1 %
BILIRUB SERPL-MCNC: 0.6 MG/DL (ref 0–1.2)
BUN SERPL-MCNC: 20 MG/DL (ref 6–23)
CALCIUM SERPL-MCNC: 10 MG/DL (ref 8.6–10.3)
CHLORIDE SERPL-SCNC: 95 MMOL/L (ref 98–107)
CHOLEST SERPL-MCNC: 215 MG/DL (ref 0–199)
CHOLESTEROL/HDL RATIO: 2.3
CO2 SERPL-SCNC: 31 MMOL/L (ref 21–32)
CREAT SERPL-MCNC: 0.61 MG/DL (ref 0.5–1.05)
EGFRCR SERPLBLD CKD-EPI 2021: 88 ML/MIN/1.73M*2
EOSINOPHIL # BLD AUTO: 0.13 X10*3/UL (ref 0–0.4)
EOSINOPHIL NFR BLD AUTO: 1.8 %
ERYTHROCYTE [DISTWIDTH] IN BLOOD BY AUTOMATED COUNT: 14.4 % (ref 11.5–14.5)
GLUCOSE SERPL-MCNC: 94 MG/DL (ref 74–99)
HCT VFR BLD AUTO: 46.3 % (ref 36–46)
HDLC SERPL-MCNC: 94.9 MG/DL
HGB BLD-MCNC: 15.2 G/DL (ref 12–16)
IMM GRANULOCYTES # BLD AUTO: 0.07 X10*3/UL (ref 0–0.5)
IMM GRANULOCYTES NFR BLD AUTO: 1 % (ref 0–0.9)
LDLC SERPL CALC-MCNC: 98 MG/DL
LYMPHOCYTES # BLD AUTO: 1.05 X10*3/UL (ref 0.8–3)
LYMPHOCYTES NFR BLD AUTO: 14.8 %
MCH RBC QN AUTO: 30 PG (ref 26–34)
MCHC RBC AUTO-ENTMCNC: 32.8 G/DL (ref 32–36)
MCV RBC AUTO: 92 FL (ref 80–100)
MONOCYTES # BLD AUTO: 0.76 X10*3/UL (ref 0.05–0.8)
MONOCYTES NFR BLD AUTO: 10.7 %
NEUTROPHILS # BLD AUTO: 5.03 X10*3/UL (ref 1.6–5.5)
NEUTROPHILS NFR BLD AUTO: 70.7 %
NON HDL CHOLESTEROL: 120 MG/DL (ref 0–149)
NRBC BLD-RTO: 0 /100 WBCS (ref 0–0)
PLATELET # BLD AUTO: 327 X10*3/UL (ref 150–450)
POTASSIUM SERPL-SCNC: 4.5 MMOL/L (ref 3.5–5.3)
PROT SERPL-MCNC: 6.7 G/DL (ref 6.4–8.2)
RBC # BLD AUTO: 5.06 X10*6/UL (ref 4–5.2)
SODIUM SERPL-SCNC: 133 MMOL/L (ref 136–145)
TRIGL SERPL-MCNC: 110 MG/DL (ref 0–149)
TSH SERPL-ACNC: 1.08 MIU/L (ref 0.44–3.98)
VLDL: 22 MG/DL (ref 0–40)
WBC # BLD AUTO: 7.1 X10*3/UL (ref 4.4–11.3)

## 2025-02-19 PROCEDURE — 82306 VITAMIN D 25 HYDROXY: CPT | Mod: OUT,GEALAB | Performed by: REGISTERED NURSE

## 2025-02-19 PROCEDURE — 84443 ASSAY THYROID STIM HORMONE: CPT | Mod: OUT | Performed by: REGISTERED NURSE

## 2025-02-19 PROCEDURE — 80053 COMPREHEN METABOLIC PANEL: CPT | Mod: OUT | Performed by: REGISTERED NURSE

## 2025-02-19 PROCEDURE — 85025 COMPLETE CBC W/AUTO DIFF WBC: CPT | Mod: OUT | Performed by: REGISTERED NURSE

## 2025-02-19 PROCEDURE — 80061 LIPID PANEL: CPT | Mod: OUT | Performed by: REGISTERED NURSE

## 2025-02-19 PROCEDURE — 83036 HEMOGLOBIN GLYCOSYLATED A1C: CPT | Mod: OUT,GEALAB | Performed by: REGISTERED NURSE

## 2025-02-20 LAB
EST. AVERAGE GLUCOSE BLD GHB EST-MCNC: 120 MG/DL
HBA1C MFR BLD: 5.8 %

## 2025-02-26 DIAGNOSIS — Z12.31 SCREENING MAMMOGRAM FOR BREAST CANCER: ICD-10-CM

## 2025-03-05 ENCOUNTER — HOSPITAL ENCOUNTER (OUTPATIENT)
Dept: RADIOLOGY | Facility: HOSPITAL | Age: 85
Discharge: HOME | End: 2025-03-05
Payer: MEDICARE

## 2025-03-05 VITALS — HEIGHT: 63 IN | WEIGHT: 181 LBS | BODY MASS INDEX: 32.07 KG/M2

## 2025-03-05 DIAGNOSIS — M81.0 AGE-RELATED OSTEOPOROSIS WITHOUT CURRENT PATHOLOGICAL FRACTURE: ICD-10-CM

## 2025-03-05 DIAGNOSIS — Z12.31 SCREENING MAMMOGRAM FOR BREAST CANCER: ICD-10-CM

## 2025-03-05 PROCEDURE — 77063 BREAST TOMOSYNTHESIS BI: CPT | Performed by: STUDENT IN AN ORGANIZED HEALTH CARE EDUCATION/TRAINING PROGRAM

## 2025-03-05 PROCEDURE — 77067 SCR MAMMO BI INCL CAD: CPT | Performed by: STUDENT IN AN ORGANIZED HEALTH CARE EDUCATION/TRAINING PROGRAM

## 2025-03-05 PROCEDURE — 77067 SCR MAMMO BI INCL CAD: CPT

## 2025-03-06 ENCOUNTER — HOSPITAL ENCOUNTER (OUTPATIENT)
Dept: RADIOLOGY | Facility: HOSPITAL | Age: 85
Discharge: HOME | End: 2025-03-06
Payer: MEDICARE

## 2025-03-06 PROCEDURE — 77080 DXA BONE DENSITY AXIAL: CPT

## 2025-03-20 ENCOUNTER — APPOINTMENT (OUTPATIENT)
Dept: SURGERY | Facility: CLINIC | Age: 85
End: 2025-03-20
Payer: MEDICARE

## 2025-03-20 VITALS
DIASTOLIC BLOOD PRESSURE: 65 MMHG | WEIGHT: 185 LBS | HEART RATE: 65 BPM | OXYGEN SATURATION: 95 % | HEIGHT: 63 IN | SYSTOLIC BLOOD PRESSURE: 153 MMHG | BODY MASS INDEX: 32.78 KG/M2

## 2025-03-20 DIAGNOSIS — N63.12 MASS OF UPPER INNER QUADRANT OF RIGHT BREAST: Primary | ICD-10-CM

## 2025-03-20 DIAGNOSIS — N63.12 MASS OF UPPER INNER QUADRANT OF RIGHT BREAST: ICD-10-CM

## 2025-03-20 PROCEDURE — 3078F DIAST BP <80 MM HG: CPT | Performed by: SURGERY

## 2025-03-20 PROCEDURE — 3077F SYST BP >= 140 MM HG: CPT | Performed by: SURGERY

## 2025-03-20 PROCEDURE — 1159F MED LIST DOCD IN RCRD: CPT | Performed by: SURGERY

## 2025-03-20 PROCEDURE — 1157F ADVNC CARE PLAN IN RCRD: CPT | Performed by: SURGERY

## 2025-03-20 PROCEDURE — 99213 OFFICE O/P EST LOW 20 MIN: CPT | Performed by: SURGERY

## 2025-03-20 NOTE — PROGRESS NOTES
Subjective   Patient ID: Sunita Luther is a 85 y.o. female who presents for No chief complaint on file..  HPI this is a pleasant patient who is a sister of Jamaal De La Garza.  I have known her for many years.  A few years ago we did a ventral incisional hernia repair and parastomal repair.  She is doing reasonably well from that standpoint and wears a belt every day to help secure her abdominal wall.  Her colostomy is functioning well.  Today she has a concern because she thought she felt a lump in her right breast.  She had a mammogram and this was unremarkable.  She had not had a mammogram for 17 years.  Also she has multiple lipomas and there is one over her left wrist that is particularly bothersome and she is interested in having that removed.  It has not changed in size over time and any dramatic way and does not hurt.    Review of Systems  10 point review is otherwise negative  Objective   Physical Exam head is normocephalic atraumatic eyes extraocular movements are intact pupils are equal and round lungs are clear bilaterally heart is regular rate and rhythm the wrist is examined over the ulnar aspect there is a small mobile lipoma that is just beneath the skin.  The breast exam today shows the breasts are symmetric there are no skin changes no nipple discharge and no retraction.  There are no palpable masses in either breast except for in the right medial upper breast there is a small area that may be another lipoma or could be a small fibroadenoma.  Axillae are negative and supraclavicular fossae are negative abdominal exam is benign.    Assessment/Plan impression at this time is that of small lipoma which were planning to excise under local anesthetic in the near future the breast lump is likely benign but I recommended an ultrasound and we will call her with those results.           Ambar Guzman MD 03/20/25 1:35 PM

## 2025-04-07 ENCOUNTER — ANESTHESIA EVENT (OUTPATIENT)
Dept: OPERATING ROOM | Facility: HOSPITAL | Age: 85
End: 2025-04-07
Payer: MEDICARE

## 2025-04-07 RX ORDER — DIPHENHYDRAMINE HYDROCHLORIDE 50 MG/ML
12.5 INJECTION, SOLUTION INTRAMUSCULAR; INTRAVENOUS ONCE AS NEEDED
Status: CANCELLED | OUTPATIENT
Start: 2025-04-07

## 2025-04-07 RX ORDER — SODIUM CHLORIDE, SODIUM LACTATE, POTASSIUM CHLORIDE, CALCIUM CHLORIDE 600; 310; 30; 20 MG/100ML; MG/100ML; MG/100ML; MG/100ML
100 INJECTION, SOLUTION INTRAVENOUS CONTINUOUS
Status: CANCELLED | OUTPATIENT
Start: 2025-04-07 | End: 2025-04-08

## 2025-04-07 RX ORDER — DROPERIDOL 2.5 MG/ML
0.62 INJECTION, SOLUTION INTRAMUSCULAR; INTRAVENOUS ONCE AS NEEDED
Status: CANCELLED | OUTPATIENT
Start: 2025-04-07

## 2025-04-07 RX ORDER — ALBUTEROL SULFATE 0.83 MG/ML
2.5 SOLUTION RESPIRATORY (INHALATION) ONCE AS NEEDED
Status: CANCELLED | OUTPATIENT
Start: 2025-04-07

## 2025-04-07 RX ORDER — MEPERIDINE HYDROCHLORIDE 25 MG/ML
12.5 INJECTION INTRAMUSCULAR; INTRAVENOUS; SUBCUTANEOUS EVERY 10 MIN PRN
Status: CANCELLED | OUTPATIENT
Start: 2025-04-07

## 2025-04-07 RX ORDER — ONDANSETRON HYDROCHLORIDE 2 MG/ML
4 INJECTION, SOLUTION INTRAVENOUS ONCE AS NEEDED
Status: CANCELLED | OUTPATIENT
Start: 2025-04-07

## 2025-04-08 ENCOUNTER — ANESTHESIA (OUTPATIENT)
Dept: OPERATING ROOM | Facility: HOSPITAL | Age: 85
End: 2025-04-08
Payer: MEDICARE

## 2025-04-08 ENCOUNTER — HOSPITAL ENCOUNTER (OUTPATIENT)
Facility: HOSPITAL | Age: 85
Setting detail: OUTPATIENT SURGERY
Discharge: HOME | End: 2025-04-08
Attending: SURGERY | Admitting: SURGERY
Payer: MEDICARE

## 2025-04-08 VITALS
HEART RATE: 70 BPM | SYSTOLIC BLOOD PRESSURE: 136 MMHG | WEIGHT: 178.57 LBS | OXYGEN SATURATION: 99 % | DIASTOLIC BLOOD PRESSURE: 69 MMHG | BODY MASS INDEX: 31.64 KG/M2 | HEIGHT: 63 IN | TEMPERATURE: 97.5 F | RESPIRATION RATE: 15 BRPM

## 2025-04-08 DIAGNOSIS — D17.22 LIPOMA OF LEFT FOREARM: ICD-10-CM

## 2025-04-08 PROCEDURE — 3700000002 HC GENERAL ANESTHESIA TIME - EACH INCREMENTAL 1 MINUTE: Performed by: SURGERY

## 2025-04-08 PROCEDURE — 7100000010 HC PHASE TWO TIME - EACH INCREMENTAL 1 MINUTE: Performed by: SURGERY

## 2025-04-08 PROCEDURE — 3700000001 HC GENERAL ANESTHESIA TIME - INITIAL BASE CHARGE: Performed by: SURGERY

## 2025-04-08 PROCEDURE — 2500000004 HC RX 250 GENERAL PHARMACY W/ HCPCS (ALT 636 FOR OP/ED): Performed by: NURSE ANESTHETIST, CERTIFIED REGISTERED

## 2025-04-08 PROCEDURE — 7100000009 HC PHASE TWO TIME - INITIAL BASE CHARGE: Performed by: SURGERY

## 2025-04-08 PROCEDURE — 2500000004 HC RX 250 GENERAL PHARMACY W/ HCPCS (ALT 636 FOR OP/ED): Performed by: SURGERY

## 2025-04-08 PROCEDURE — 3600000002 HC OR TIME - INITIAL BASE CHARGE - PROCEDURE LEVEL TWO: Performed by: SURGERY

## 2025-04-08 PROCEDURE — 2720000007 HC OR 272 NO HCPCS: Performed by: SURGERY

## 2025-04-08 PROCEDURE — 25075 EXC FOREARM LES SC < 3 CM: CPT | Performed by: SURGERY

## 2025-04-08 PROCEDURE — A25075: Performed by: ANESTHESIOLOGY

## 2025-04-08 PROCEDURE — 88304 TISSUE EXAM BY PATHOLOGIST: CPT | Mod: TC,GEALAB | Performed by: SURGERY

## 2025-04-08 PROCEDURE — 2500000004 HC RX 250 GENERAL PHARMACY W/ HCPCS (ALT 636 FOR OP/ED): Performed by: ANESTHESIOLOGY

## 2025-04-08 PROCEDURE — 3600000007 HC OR TIME - EACH INCREMENTAL 1 MINUTE - PROCEDURE LEVEL TWO: Performed by: SURGERY

## 2025-04-08 PROCEDURE — A25075: Performed by: NURSE ANESTHETIST, CERTIFIED REGISTERED

## 2025-04-08 PROCEDURE — 99100 ANES PT EXTEME AGE<1 YR&>70: CPT | Performed by: ANESTHESIOLOGY

## 2025-04-08 RX ORDER — SODIUM CHLORIDE, SODIUM LACTATE, POTASSIUM CHLORIDE, CALCIUM CHLORIDE 600; 310; 30; 20 MG/100ML; MG/100ML; MG/100ML; MG/100ML
20 INJECTION, SOLUTION INTRAVENOUS CONTINUOUS
Status: DISCONTINUED | OUTPATIENT
Start: 2025-04-08 | End: 2025-04-08 | Stop reason: HOSPADM

## 2025-04-08 RX ORDER — CEFAZOLIN SODIUM 1 G/50ML
1 SOLUTION INTRAVENOUS ONCE
Status: COMPLETED | OUTPATIENT
Start: 2025-04-08 | End: 2025-04-08

## 2025-04-08 RX ORDER — LIDOCAINE HYDROCHLORIDE 10 MG/ML
INJECTION, SOLUTION EPIDURAL; INFILTRATION; INTRACAUDAL; PERINEURAL AS NEEDED
Status: DISCONTINUED | OUTPATIENT
Start: 2025-04-08 | End: 2025-04-08

## 2025-04-08 RX ORDER — PROPOFOL 10 MG/ML
INJECTION, EMULSION INTRAVENOUS AS NEEDED
Status: DISCONTINUED | OUTPATIENT
Start: 2025-04-08 | End: 2025-04-08

## 2025-04-08 RX ADMIN — PROPOFOL 30 MG: 10 INJECTION, EMULSION INTRAVENOUS at 08:05

## 2025-04-08 RX ADMIN — CEFAZOLIN SODIUM 1 G: 1 INJECTION, SOLUTION INTRAVENOUS at 08:05

## 2025-04-08 RX ADMIN — PROPOFOL 120 MCG/KG/MIN: 10 INJECTION, EMULSION INTRAVENOUS at 08:07

## 2025-04-08 RX ADMIN — LIDOCAINE HYDROCHLORIDE 30 ML: 10 INJECTION, SOLUTION EPIDURAL; INFILTRATION; INTRACAUDAL; PERINEURAL at 08:05

## 2025-04-08 RX ADMIN — SODIUM CHLORIDE, POTASSIUM CHLORIDE, SODIUM LACTATE AND CALCIUM CHLORIDE 20 ML/HR: 600; 310; 30; 20 INJECTION, SOLUTION INTRAVENOUS at 07:26

## 2025-04-08 SDOH — HEALTH STABILITY: MENTAL HEALTH: CURRENT SMOKER: 0

## 2025-04-08 ASSESSMENT — PAIN SCALES - GENERAL
PAIN_LEVEL: 2
PAINLEVEL_OUTOF10: 0 - NO PAIN
PAINLEVEL_OUTOF10: 0 - NO PAIN

## 2025-04-08 ASSESSMENT — PAIN - FUNCTIONAL ASSESSMENT: PAIN_FUNCTIONAL_ASSESSMENT: 0-10

## 2025-04-08 NOTE — ANESTHESIA POSTPROCEDURE EVALUATION
Patient: Sunita Luther    Procedure Summary       Date: 04/08/25 Room / Location: [facility] OR 07 / [facility] OR    Anesthesia Start: 0750 Anesthesia Stop: 0828    Procedure: EXCISION OF LEFT WRIST LIPOMA (Bilateral: Arm Lower) Diagnosis:       Lipoma of left forearm      (Lipoma of left forearm [D17.22])    Surgeons: Ambar Guzman MD Responsible Provider: August Mcmullen MD    Anesthesia Type: MAC ASA Status: 3            Anesthesia Type: MAC    Vitals Value Taken Time   /69 04/08/25 0840   Temp 36.4 °C (97.5 °F) 04/08/25 0825   Pulse 70 04/08/25 0840   Resp 15 04/08/25 0840   SpO2 99 % 04/08/25 0840       Anesthesia Post Evaluation    Patient location during evaluation: PACU  Patient participation: complete - patient participated  Level of consciousness: awake  Pain score: 2  Pain management: adequate  Multimodal analgesia pain management approach  Airway patency: patent  Two or more strategies used to mitigate risk of obstructive sleep apnea  Cardiovascular status: acceptable  Respiratory status: acceptable  Hydration status: acceptable  Postoperative Nausea and Vomiting: none    No notable events documented.

## 2025-04-08 NOTE — ANESTHESIA PREPROCEDURE EVALUATION
Patient: Sunita Luther    Procedure Information       Anesthesia Start Date/Time: 04/08/25 0750    Procedure: EXCISION OF LEFT WRIST LIPOMA (Left: Arm Lower)    Location: [facility] OR 07 / [facility] OR    Surgeons: Ambar Guzman MD            Relevant Problems   Cardiac   (+) Benign essential hypertension   (+) Murmur   (+) Paroxysmal atrial fibrillation (Multi)      Neuro   (+) Lumbar radiculitis      /Renal   (+) Hyponatremia      Musculoskeletal   (+) Lumbar disc displacement without myelopathy   (+) Primary localized osteoarthritis of right hip       Clinical information reviewed:   Tobacco  Allergies  Meds  Problems  Med Hx  Surg Hx  OB Status    Fam Hx  Soc Hx        NPO Detail:  NPO/Void Status  Carbohydrate Drink Given Prior to Surgery? : N  Date of Last Liquid: 04/08/25  Time of Last Liquid: 0500  Date of Last Solid: 04/07/25  Time of Last Solid: 1700  Last Intake Type: Clear fluids  Time of Last Void: 0700         Physical Exam    Airway  Mallampati: II  TM distance: >3 FB     Cardiovascular   Rhythm: regular  Rate: normal     Dental - normal exam     Pulmonary   Breath sounds clear to auscultation     Abdominal            Anesthesia Plan    History of general anesthesia?: yes  History of complications of general anesthesia?: no    ASA 3     MAC     The patient is not a current smoker.    intravenous induction   Anesthetic plan and risks discussed with patient.  Use of blood products discussed with who consented to blood products.    Plan discussed with CRNA and attending.

## 2025-04-08 NOTE — OP NOTE
EXCISION OF LEFT WRIST LIPOMA (B) Operative Note     Date: 2025  OR Location: [facility] OR    Name: Sunita Luther, : 1940, Age: 85 y.o., MRN: [id number], Sex: female    Diagnosis  Pre-op Diagnosis      * Lipoma of left forearm [D17.22] Post-op Diagnosis     * Lipoma of left forearm [D17.22]     Procedures  EXCISION OF LEFT WRIST LIPOMA  35035 - LA EXC TUMOR SOFT TISSUE FOREARM &/WRIST SUBQ <3CM    Right wrist lipoma  Surgeons      * Ambar Guzman - Primary    Resident/Fellow/Other Assistant:  Surgeons and Role:  * No surgeons found with a matching role *    Staff:   Circulator: Abbi Amador Person: Antony  Surgical Assistant: Deo    Anesthesia Staff: Anesthesiologist: August Mcmullen MD  CRNA: URVASHI Fuentes-SERGE    Procedure Summary  Anesthesia: Monitor Anesthesia Care  ASA: III  Estimated Blood Loss: 1mL  Intra-op Medications:   Administrations occurring from 0745 to 0900 on 25:   Medication Name Total Dose   BUPivacaine HCl (Marcaine) 0.5 % (5 mg/mL) 30 mL, lidocaine PF (Xylocaine) 10 mg/mL (1 %) 30 mL, sodium bicarbonate 1 mEq/mL (8.4 %) 6 mEq syringe 6 mL   lidocaine PF (Xylocaine-MPF) local injection 1 % 30 mL   propofol (Diprivan) injection 10 mg/mL 84 mg   ceFAZolin (Ancef) 1 g in dextrose (iso) IV 50 mL 1 g              Anesthesia Record               Intraprocedure I/O Totals       None           Specimen:   ID Type Source Tests Collected by Time   1 : LEFT WRIST LIPOMA Tissue LIPOMA SURGICAL PATHOLOGY EXAM Ambar Guzman MD 2025 0811   2 : RIGHT WRIST LIPOMA Tissue LIPOMA SURGICAL PATHOLOGY EXAM Ambar Guzman MD 2025 0812                 Drains and/or Catheters:   Closed/Suction Drain Anterior RLQ Bulb 19 Fr. (Active)       Colostomy LUQ (Active)       Tourniquet Times:         Implants:     Findings: Lipomas    Indications: Sunita Luther is an 85 y.o. female who is having surgery for Lipoma of left forearm [D17.22].  And right forearm    The patient was seen in the  preoperative area. The risks, benefits, complications, treatment options, non-operative alternatives, expected recovery and outcomes were discussed with the patient. The possibilities of reaction to medication, pulmonary aspiration, injury to surrounding structures, bleeding, recurrent infection, the need for additional procedures, failure to diagnose a condition, and creating a complication requiring transfusion or operation were discussed with the patient. The patient concurred with the proposed plan, giving informed consent.  The site of surgery was properly noted/marked if necessary per policy. The patient has been actively warmed in preoperative area. Preoperative antibiotics have been ordered and given within 1 hours of incision. Venous thrombosis prophylaxis have been ordered including bilateral sequential compression devices    Procedure Details: The patient was brought to the operating theater and placed on the operating table in the supine position.  After sufficient sedative anesthesia was administered the patient's arms were prepped and draped in the usual sterile manner.  These lipomas were small and on the dorsal aspect of the superior wrist.  Each area was anesthetized with 1% lidocaine a small incision was made and the lipomas extruded with gentle blunt pressure.  The attachments were divided using cautery closure on both sides was achieved using interrupted 3-0 Vicryl for deep dermal layer followed by a running 4-0 PDS's for 4-0 Vicryl for the subcuticular layer.  Dermabond was applied as a dressing.  She tolerated this well.  Complications:  None; patient tolerated the procedure well.    Disposition: PACU - hemodynamically stable.  Condition: stable         Task Performed by RNFA or Surgical Assistant:  Closing          Additional Details:     Attending Attestation:     Ambar Guzman  Phone Number: [phone]

## 2025-04-16 LAB
LABORATORY COMMENT REPORT: NORMAL
PATH REPORT.FINAL DX SPEC: NORMAL
PATH REPORT.GROSS SPEC: NORMAL
PATH REPORT.RELEVANT HX SPEC: NORMAL
PATH REPORT.TOTAL CANCER: NORMAL

## 2025-04-18 ENCOUNTER — HOSPITAL ENCOUNTER (OUTPATIENT)
Dept: RADIOLOGY | Facility: HOSPITAL | Age: 85
Discharge: HOME | End: 2025-04-18
Payer: MEDICARE

## 2025-04-18 ENCOUNTER — HOSPITAL ENCOUNTER (OUTPATIENT)
Dept: CARDIOLOGY | Facility: HOSPITAL | Age: 85
Discharge: HOME | End: 2025-04-18
Payer: MEDICARE

## 2025-04-18 DIAGNOSIS — I08.0 MITRAL VALVE INSUFFICIENCY AND AORTIC VALVE INSUFFICIENCY: ICD-10-CM

## 2025-04-18 DIAGNOSIS — M48.062 SPINAL STENOSIS OF LUMBAR REGION WITH NEUROGENIC CLAUDICATION: ICD-10-CM

## 2025-04-18 PROCEDURE — 93306 TTE W/DOPPLER COMPLETE: CPT

## 2025-04-18 PROCEDURE — 72148 MRI LUMBAR SPINE W/O DYE: CPT

## 2025-04-21 ENCOUNTER — APPOINTMENT (OUTPATIENT)
Dept: SURGERY | Facility: CLINIC | Age: 85
End: 2025-04-21
Payer: MEDICARE

## 2025-04-21 VITALS
RESPIRATION RATE: 16 BRPM | BODY MASS INDEX: 32.06 KG/M2 | SYSTOLIC BLOOD PRESSURE: 147 MMHG | HEART RATE: 63 BPM | OXYGEN SATURATION: 94 % | WEIGHT: 181 LBS | DIASTOLIC BLOOD PRESSURE: 69 MMHG

## 2025-04-21 DIAGNOSIS — D17.20 LIPOMA OF UPPER EXTREMITY, UNSPECIFIED LATERALITY: Primary | ICD-10-CM

## 2025-04-21 PROCEDURE — 3077F SYST BP >= 140 MM HG: CPT | Performed by: SURGERY

## 2025-04-21 PROCEDURE — 1157F ADVNC CARE PLAN IN RCRD: CPT | Performed by: SURGERY

## 2025-04-21 PROCEDURE — 3078F DIAST BP <80 MM HG: CPT | Performed by: SURGERY

## 2025-04-21 PROCEDURE — 1159F MED LIST DOCD IN RCRD: CPT | Performed by: SURGERY

## 2025-04-21 PROCEDURE — 99024 POSTOP FOLLOW-UP VISIT: CPT | Performed by: SURGERY

## 2025-04-21 PROCEDURE — 1126F AMNT PAIN NOTED NONE PRSNT: CPT | Performed by: SURGERY

## 2025-04-21 ASSESSMENT — PAIN SCALES - GENERAL: PAINLEVEL_OUTOF10: 0-NO PAIN

## 2025-04-21 NOTE — PROGRESS NOTES
Subjective   Patient ID: Sunita Luther is a 85 y.o. female who presents for Follow-up (Left wrist excision).  HPI  This is a patient who I have known for many years.  We recently excised a lipoma from each wrist.  Where these were located they were somewhat bothersome.  She has been healing well since.  Review of Systems  10 point review is otherwise negative  Objective   Physical Exam on exam the incisions are well-healed.    Assessment/Plan follow-up as needed.  I had ordered a breast ultrasound for the patient a while back and that has yet to be done.           Ambar Guzman MD 04/21/25 1:06 PM

## 2025-04-25 ENCOUNTER — APPOINTMENT (OUTPATIENT)
Dept: CARDIOLOGY | Facility: HOSPITAL | Age: 85
End: 2025-04-25
Payer: MEDICARE

## 2025-04-27 LAB
AORTIC VALVE MEAN GRADIENT: 5 MMHG
AORTIC VALVE PEAK VELOCITY: 1.48 M/S
AV PEAK GRADIENT: 9 MMHG
AVA (PEAK VEL): 2.97 CM2
AVA (VTI): 2.99 CM2
EJECTION FRACTION APICAL 4 CHAMBER: 58.1
EJECTION FRACTION: 63 %
LEFT ATRIUM VOLUME AREA LENGTH INDEX BSA: 26.2 ML/M2
LEFT VENTRICLE INTERNAL DIMENSION DIASTOLE: 3.85 CM (ref 3.5–6)
LEFT VENTRICULAR OUTFLOW TRACT DIAMETER: 2 CM
MITRAL VALVE E/A RATIO: 0.79
RIGHT VENTRICLE FREE WALL PEAK S': 12.1 CM/S
RIGHT VENTRICLE PEAK SYSTOLIC PRESSURE: 36.2 MMHG
TRICUSPID ANNULAR PLANE SYSTOLIC EXCURSION: 1.8 CM

## 2025-04-28 ENCOUNTER — OFFICE VISIT (OUTPATIENT)
Dept: CARDIOLOGY | Facility: HOSPITAL | Age: 85
End: 2025-04-28
Payer: MEDICARE

## 2025-04-28 VITALS
OXYGEN SATURATION: 95 % | DIASTOLIC BLOOD PRESSURE: 68 MMHG | WEIGHT: 179.9 LBS | HEART RATE: 60 BPM | SYSTOLIC BLOOD PRESSURE: 145 MMHG | BODY MASS INDEX: 31.87 KG/M2

## 2025-04-28 DIAGNOSIS — I10 HYPERTENSION, UNSPECIFIED TYPE: ICD-10-CM

## 2025-04-28 DIAGNOSIS — I10 BENIGN ESSENTIAL HYPERTENSION: Primary | ICD-10-CM

## 2025-04-28 DIAGNOSIS — E78.5 HYPERLIPIDEMIA, UNSPECIFIED HYPERLIPIDEMIA TYPE: ICD-10-CM

## 2025-04-28 PROCEDURE — 3077F SYST BP >= 140 MM HG: CPT | Performed by: NURSE PRACTITIONER

## 2025-04-28 PROCEDURE — 99214 OFFICE O/P EST MOD 30 MIN: CPT | Performed by: NURSE PRACTITIONER

## 2025-04-28 PROCEDURE — G2211 COMPLEX E/M VISIT ADD ON: HCPCS | Performed by: NURSE PRACTITIONER

## 2025-04-28 PROCEDURE — 3078F DIAST BP <80 MM HG: CPT | Performed by: NURSE PRACTITIONER

## 2025-04-28 PROCEDURE — 1157F ADVNC CARE PLAN IN RCRD: CPT | Performed by: NURSE PRACTITIONER

## 2025-04-28 PROCEDURE — 1159F MED LIST DOCD IN RCRD: CPT | Performed by: NURSE PRACTITIONER

## 2025-04-28 PROCEDURE — 1036F TOBACCO NON-USER: CPT | Performed by: NURSE PRACTITIONER

## 2025-04-28 RX ORDER — LOSARTAN POTASSIUM AND HYDROCHLOROTHIAZIDE 25; 100 MG/1; MG/1
1 TABLET ORAL DAILY
Qty: 90 TABLET | Refills: 3 | Status: SHIPPED | OUTPATIENT
Start: 2025-04-28 | End: 2026-04-28

## 2025-04-28 NOTE — PROGRESS NOTES
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 The patient is seen in collaboration with Dr. Huynh. Sister states she has general arthritis pain. Gets muscle cramps during the night.  Occasional lightheadedness and dizziness. Denies chest pain, shortness of breath or heart palpitations Watching diet.       Last Recorded Vitals:  Vitals:    04/28/25 1400   BP: 145/68   Pulse: 60   SpO2: 95%   Weight: 81.6 kg (179 lb 14.3 oz)       Past Medical History:  She has a past medical history of Cardiomyopathy, Class 1 obesity with body mass index (BMI) of 32.0 to 32.9 in adult, Colovaginal fistula, Diverticulosis, Encounter for pre-operative cardiovascular clearance (10/23/2023), Lumbar disc displacement without myelopathy, Lumbar radiculitis, Lumbar spondylosis, Osteoarthritis of right hip, Osteoporosis, Parastomal hernia without obstruction or gangrene, Paroxysmal atrial fibrillation (Multi), Personal history of other diseases of the circulatory system (07/18/2014), and Postmenopausal bleeding.    She has no past medical history of Personal history of irradiation.    Past Surgical History:  She has a past surgical history that includes Hemorrhoid surgery (02/24/2014); Varicose vein surgery (02/24/2014); Hysterectomy (1986); Rectal surgery (12/18/2015); Oophorectomy (1985); Breast biopsy (Right, 1980); and Ventral hernia repair (11/07/2023).      Social History:  She reports that she has never smoked. She has never been exposed to tobacco smoke. She has never used smokeless tobacco. She reports that she does not drink alcohol and does not use drugs.    Family History:  Family History   Problem Relation Name Age of Onset    Hypertension Mother      Heart attack Mother       Stroke Father      Lung cancer Father          Allergies:  Hydrocodone-acetaminophen, Oxycodone, and Bactrim [sulfamethoxazole-trimethoprim]    Outpatient Medications:  Current Outpatient Medications   Medication Instructions    acetaminophen (TYLENOL) 1,000 mg, oral, Every 8 hours PRN    aspirin 81 mg, Daily    atorvastatin (LIPITOR) 10 mg, Daily    losartan-hydrochlorothiazide (Hyzaar) 100-25 mg tablet 1 tablet, oral, Daily    magnesium oxide (Mag-Ox) 400 mg tablet Magnesium 400 MG Oral Tablet   Refills: 0        Start : 13-May-2019   Active    melatonin 1 mg, Daily    multivitamin tablet Multi-Vitamin TABS   Refills: 0       Active    omega-3 fatty acids-fish oil (One-Per-Day Omega-3) 684-1,200 mg capsule Omega 3 CAPS   Refills: 0       Active    psyllium (Metamucil) 3.4 gram packet 1 packet, Daily       Physical Exam:  Constitutional: Pleasant, Awake/Alert/Oriented to person place and time. No distress  Head: Atraumatic, Normocephalic  Eyes: EOMI. JAYCE  Neck:  No JVD  Cardiovascular: Regular rate and rhythm, S1, S2. 2/6 murmur RUSB   Respiratory: Clear to auscultation bilaterally. No wheezing, rales or rhonchi.   Abdomen: Soft, Nontender. Bowel sounds appreciated  Musculoskeletal: ROM intact. Muscle strength grossly intact upper and lower extremities 5/5.   Neurological: CNII-XII intact. Sensation grossly intact  Extremities: Warm and dry. Trace pitting edema BLE   Psychiatric: Appropriate mood and affect       Last Labs:  CBC -  Lab Results   Component Value Date    WBC 7.1 02/19/2025    HGB 15.2 02/19/2025    HCT 46.3 (H) 02/19/2025    MCV 92 02/19/2025     02/19/2025       CMP -  Lab Results   Component Value Date    CALCIUM 10.0 02/19/2025    PHOS 4.0 11/08/2023    PROT 6.7 02/19/2025    ALBUMIN 4.4 02/19/2025    AST 16 02/19/2025    ALT 22 02/19/2025    ALKPHOS 73 02/19/2025    BILITOT 0.6 02/19/2025       LIPID PANEL -   Lab Results   Component Value Date    CHOL 215 (H) 02/19/2025    TRIG 110  02/19/2025    HDL 94.9 02/19/2025    CHHDL 2.3 02/19/2025    LDLF 68 09/12/2023    VLDL 22 02/19/2025    NHDL 120 02/19/2025       RENAL FUNCTION PANEL -   Lab Results   Component Value Date    GLUCOSE 94 02/19/2025     (L) 02/19/2025    K 4.5 02/19/2025    CL 95 (L) 02/19/2025    CO2 31 02/19/2025    ANIONGAP 12 02/19/2025    BUN 20 02/19/2025    CREATININE 0.61 02/19/2025    CALCIUM 10.0 02/19/2025    PHOS 4.0 11/08/2023    ALBUMIN 4.4 02/19/2025        Lab Results   Component Value Date    BNP 66 04/19/2022    HGBA1C 5.8 (H) 02/19/2025       Last Cardiology Tests:  ECG:    Echo:  Echocardiogram 4/18/2025  1. The left ventricular systolic function is normal, with a visually estimated ejection fraction of 60-65%.   2. Spectral Doppler shows a Grade I (impaired relaxation pattern) of left ventricular diastolic filling with normal left atrial filling pressure.   3. There is moderately increased septal and moderately increased posterior left ventricular wall thickness.   4. Mild aortic valve regurgitation.   5. There is mild mitral and tricuspid regurgitation.   6. There is mild dilatation of the aortic root.   7. Small pericardial effusion.   8. The estimated pulmonary artery pressure is mildly elevated with the RVSP at 36.2 mmHg.    12/22/2022  CONCLUSIONS:   1. Left ventricular systolic function is normal with a 60-65% estimated ejection fraction.   2. Spectral Doppler shows an impaired relaxation pattern of left ventricular diastolic filling.   3. Mild to moderate aortic valve regurgitation.   4. There is mild mitral and tricuspid regurgitation.    10/4/2018  CONCLUSIONS:   1. The left ventricular systolic function is normal with a 60-65% estimated ejection fraction.   2. Spectral Doppler shows an impaired relaxation pattern of left ventricular diastolic filling.   3. Concentric left ventricular hypertrophy.    7/2/2018  CONCLUSIONS:   1. The left ventricular systolic function is moderately decreased with a  35% estimated ejection fraction.   2. Entire apex is abnormal.   3. Spectral Doppler shows an impaired relaxation pattern of left ventricular diastolic filling.   4. There is moderate aortic valve regurgitation.   5. There is mild mitral, tricuspid, and pulmonic regurgitation.   6. The estimated pulmonary artery pressure is mildly elevated with the RVSP at 42.7 mmHg.   7. There is a trivial pericardial effusion.     6/21/2018  CONCLUSIONS:   1. The left ventricular systolic function is normal with a 55-60% estimated ejection fraction.   2. Spectral Doppler shows an impaired relaxation pattern of left ventricular diastolic filling.   3. There is mild to moderate aortic valve regurgitation.     1/27/2016     CONCLUSIONS:   1. The left ventricular systolic function is normal with a 65% ejection fraction.   2. Spectral Doppler shows an impaired relaxation pattern of left ventricular diastolic filling.   3. There may be a mild left ventricular outflow tract obstruction as evidenced by color Doppler (however, measured velocities are normal).   4. The left atrium is normal in size.   5. There is mild to moderate aortic valve regurgitation.    Cath:  7/1/2018  CONCLUSIONS:   1. Minimal nonobstructive CAD in a right dominant circulation.   2. No significant LV-AO peak to peak pullback gradient.   3. Left Ventricular end-diastolic pressure = 27.       Lab review: I have personally reviewed the laboratory result(s)   Diagnostic review: I have personally reviewed the result(s) of the Echocardiogram, [facility]     Assessment/Plan   Very pleasant 84 y.o. female sister from [place] with h/o stress induced cardiomyopathy 7/2018 with recovered EF 60% ([facility] 7/1/2018 non-obstructive CAD), htn, osteoarthritis, continues to do well from a cardiac standpoint. Denies chest pain or shortness of breath. Heart rate and blood pressure are well controlled today    Plan:  -Continue losartan 50-12.5mg BID  -decrease atorvastatin to 10 mg three times  a week   -Recommend obtaining an echocardiogram in April for surveillance of   -call with any questions   -follow up in one year     Stephanie Ivan, APRN-CNP

## 2025-05-20 ENCOUNTER — HOSPITAL ENCOUNTER (OUTPATIENT)
Dept: RADIOLOGY | Facility: HOSPITAL | Age: 85
Discharge: HOME | End: 2025-05-20
Payer: MEDICARE

## 2025-05-20 DIAGNOSIS — N63.12 MASS OF UPPER INNER QUADRANT OF RIGHT BREAST: ICD-10-CM

## 2025-05-20 PROCEDURE — 76642 ULTRASOUND BREAST LIMITED: CPT | Mod: RT

## 2025-05-20 PROCEDURE — 76642 ULTRASOUND BREAST LIMITED: CPT | Mod: RIGHT SIDE | Performed by: STUDENT IN AN ORGANIZED HEALTH CARE EDUCATION/TRAINING PROGRAM

## 2025-05-20 PROCEDURE — 76982 USE 1ST TARGET LESION: CPT

## (undated) DEVICE — NEEDLE, ELECTRODE, ELECTROSURGICAL, INSULATED

## (undated) DEVICE — BINDER, ABDOMINAL, 3 PANEL, 9 X 46-62 IN, MED/LG

## (undated) DEVICE — SUTURE, VICRYL, 3-0, 27 IN, SH-1, VIOLET

## (undated) DEVICE — SOLUTION, IRRIGATION, 0.9% SODIUM CHLORIDE, 1000 ML, HANG BOTTLE

## (undated) DEVICE — ADHESIVE, SKIN, LIQUIBAND EXCEED

## (undated) DEVICE — ADHESIVE, SKIN, DERMABOND ADVANCED, 15CM, PEN-STYLE

## (undated) DEVICE — ELECTRODE, ELECTROSURGICAL, BLADE, INSULATED, ENT/IMA, STERILE

## (undated) DEVICE — SUTURE, PDS II, 0, 60 IN, CTX, VIOLET

## (undated) DEVICE — SUTURE, VICRYL, 4-0, 18 IN, PS2, UNDYED

## (undated) DEVICE — DRAPE, INCISE, ANTIMICROBIAL, IOBAN 2, STERI DRAPE, 23 X 33 IN, DISPOSABLE, STERILE

## (undated) DEVICE — SUTURE, VICRYL, 3-0, 54 IN, TIE, VIOLET

## (undated) DEVICE — RESERVOIR, DRAINAGE, WOUND, JACKSON-PRATT, 100 CC, SILICONE

## (undated) DEVICE — DRAPE PACK, MINOR, CUSTOM, GEAUGA

## (undated) DEVICE — PREP TRAY, SKIN, DRY, W/GLOVES

## (undated) DEVICE — DRAPE PACK, MAJOR, CUSTOM, GEAUGA

## (undated) DEVICE — TIP, SUCTION, YANKAUER, BULB, ADULT

## (undated) DEVICE — SUTURE, VICRYL, 3-0, 27 IN, SH

## (undated) DEVICE — COVER, TABLE, 44X90

## (undated) DEVICE — CAUTERY, PENCIL, PUSH BUTTON, SMOKE EVAC, 70MM

## (undated) DEVICE — SUTURE, SILK, 3-0, 18 IN, SH1, DETACHABLE, MULTIPACK, BLACK

## (undated) DEVICE — POUCH KIT, NEW IMAGE, DRAINABLE, 2-1/4 IN

## (undated) DEVICE — TRAY, FOLEY, DRAIN BAG, SURESTEP, 16FR, W/STATLOCK

## (undated) DEVICE — DRAIN, CHANNEL, HUBLESS, 1/4 ROUND FULL FLUTE, 19 FR/W 1/4" TROCAR"

## (undated) DEVICE — SOLUTION, IRRIGATION, SODIUM CHLORIDE 0.9%, 1000 ML, POUR BOTTLE

## (undated) DEVICE — TIP,  ELECTRODE COATED INSULATED, EXTENDED, LF

## (undated) DEVICE — LIGASURE IMPACT, 18CM

## (undated) DEVICE — SUTURE, PDS II, 4-0, 18 IN, PS2, CLEAR